# Patient Record
Sex: MALE | Race: WHITE | NOT HISPANIC OR LATINO | Employment: FULL TIME | URBAN - METROPOLITAN AREA
[De-identification: names, ages, dates, MRNs, and addresses within clinical notes are randomized per-mention and may not be internally consistent; named-entity substitution may affect disease eponyms.]

---

## 2017-02-21 ENCOUNTER — GENERIC CONVERSION - ENCOUNTER (OUTPATIENT)
Dept: OTHER | Facility: OTHER | Age: 27
End: 2017-02-21

## 2017-02-21 ENCOUNTER — ALLSCRIPTS OFFICE VISIT (OUTPATIENT)
Dept: OTHER | Facility: OTHER | Age: 27
End: 2017-02-21

## 2017-02-22 ENCOUNTER — ALLSCRIPTS OFFICE VISIT (OUTPATIENT)
Dept: OTHER | Facility: OTHER | Age: 27
End: 2017-02-22

## 2017-02-22 ENCOUNTER — GENERIC CONVERSION - ENCOUNTER (OUTPATIENT)
Dept: OTHER | Facility: OTHER | Age: 27
End: 2017-02-22

## 2017-02-23 ENCOUNTER — ALLSCRIPTS OFFICE VISIT (OUTPATIENT)
Dept: OTHER | Facility: OTHER | Age: 27
End: 2017-02-23

## 2017-02-27 ENCOUNTER — ALLSCRIPTS OFFICE VISIT (OUTPATIENT)
Dept: OTHER | Facility: OTHER | Age: 27
End: 2017-02-27

## 2017-02-27 DIAGNOSIS — Z79.899 OTHER LONG TERM (CURRENT) DRUG THERAPY: ICD-10-CM

## 2017-02-27 DIAGNOSIS — F33.2 MAJOR DEPRESSIVE DISORDER, RECURRENT SEVERE WITHOUT PSYCHOTIC FEATURES (HCC): ICD-10-CM

## 2017-02-28 ENCOUNTER — TRANSCRIBE ORDERS (OUTPATIENT)
Dept: LAB | Facility: CLINIC | Age: 27
End: 2017-02-28

## 2017-02-28 ENCOUNTER — APPOINTMENT (OUTPATIENT)
Dept: LAB | Facility: CLINIC | Age: 27
End: 2017-02-28
Payer: COMMERCIAL

## 2017-02-28 DIAGNOSIS — F33.2 MAJOR DEPRESSIVE DISORDER, RECURRENT SEVERE WITHOUT PSYCHOTIC FEATURES (HCC): ICD-10-CM

## 2017-02-28 DIAGNOSIS — Z79.899 OTHER LONG TERM (CURRENT) DRUG THERAPY: ICD-10-CM

## 2017-02-28 LAB
ALBUMIN SERPL BCP-MCNC: 4.1 G/DL (ref 3.5–5)
ALP SERPL-CCNC: 61 U/L (ref 46–116)
ALT SERPL W P-5'-P-CCNC: 17 U/L (ref 12–78)
ANION GAP SERPL CALCULATED.3IONS-SCNC: 6 MMOL/L (ref 4–13)
AST SERPL W P-5'-P-CCNC: 10 U/L (ref 5–45)
BASOPHILS # BLD AUTO: 0.03 THOUSANDS/ΜL (ref 0–0.1)
BASOPHILS NFR BLD AUTO: 1 % (ref 0–1)
BILIRUB SERPL-MCNC: 0.2 MG/DL (ref 0.2–1)
BUN SERPL-MCNC: 10 MG/DL (ref 5–25)
CALCIUM SERPL-MCNC: 9.2 MG/DL (ref 8.3–10.1)
CHLORIDE SERPL-SCNC: 104 MMOL/L (ref 100–108)
CHOLEST SERPL-MCNC: 156 MG/DL (ref 50–200)
CO2 SERPL-SCNC: 30 MMOL/L (ref 21–32)
CREAT SERPL-MCNC: 0.81 MG/DL (ref 0.6–1.3)
EOSINOPHIL # BLD AUTO: 0.28 THOUSAND/ΜL (ref 0–0.61)
EOSINOPHIL NFR BLD AUTO: 5 % (ref 0–6)
ERYTHROCYTE [DISTWIDTH] IN BLOOD BY AUTOMATED COUNT: 13.7 % (ref 11.6–15.1)
GFR SERPL CREATININE-BSD FRML MDRD: >60 ML/MIN/1.73SQ M
GLUCOSE SERPL-MCNC: 94 MG/DL (ref 65–140)
HCT VFR BLD AUTO: 44.3 % (ref 36.5–49.3)
HDLC SERPL-MCNC: 42 MG/DL (ref 40–60)
HGB BLD-MCNC: 14.5 G/DL (ref 12–17)
LDLC SERPL CALC-MCNC: 105 MG/DL (ref 0–100)
LYMPHOCYTES # BLD AUTO: 2.32 THOUSANDS/ΜL (ref 0.6–4.47)
LYMPHOCYTES NFR BLD AUTO: 41 % (ref 14–44)
MCH RBC QN AUTO: 28 PG (ref 26.8–34.3)
MCHC RBC AUTO-ENTMCNC: 32.7 G/DL (ref 31.4–37.4)
MCV RBC AUTO: 86 FL (ref 82–98)
MONOCYTES # BLD AUTO: 0.31 THOUSAND/ΜL (ref 0.17–1.22)
MONOCYTES NFR BLD AUTO: 5 % (ref 4–12)
NEUTROPHILS # BLD AUTO: 2.77 THOUSANDS/ΜL (ref 1.85–7.62)
NEUTS SEG NFR BLD AUTO: 48 % (ref 43–75)
PLATELET # BLD AUTO: 356 THOUSANDS/UL (ref 149–390)
PMV BLD AUTO: 9.9 FL (ref 8.9–12.7)
POTASSIUM SERPL-SCNC: 4.6 MMOL/L (ref 3.5–5.3)
PROT SERPL-MCNC: 7.5 G/DL (ref 6.4–8.2)
RBC # BLD AUTO: 5.17 MILLION/UL (ref 3.88–5.62)
SODIUM SERPL-SCNC: 140 MMOL/L (ref 136–145)
TRIGL SERPL-MCNC: 47 MG/DL
TSH SERPL DL<=0.05 MIU/L-ACNC: 1.05 UIU/ML (ref 0.36–3.74)
WBC # BLD AUTO: 5.71 THOUSAND/UL (ref 4.31–10.16)

## 2017-02-28 PROCEDURE — 80061 LIPID PANEL: CPT

## 2017-02-28 PROCEDURE — 80053 COMPREHEN METABOLIC PANEL: CPT

## 2017-02-28 PROCEDURE — 84443 ASSAY THYROID STIM HORMONE: CPT

## 2017-02-28 PROCEDURE — 36415 COLL VENOUS BLD VENIPUNCTURE: CPT

## 2017-02-28 PROCEDURE — 85025 COMPLETE CBC W/AUTO DIFF WBC: CPT

## 2017-03-01 ENCOUNTER — GENERIC CONVERSION - ENCOUNTER (OUTPATIENT)
Dept: OTHER | Facility: OTHER | Age: 27
End: 2017-03-01

## 2017-03-22 ENCOUNTER — ALLSCRIPTS OFFICE VISIT (OUTPATIENT)
Dept: OTHER | Facility: OTHER | Age: 27
End: 2017-03-22

## 2017-04-14 ENCOUNTER — HOSPITAL ENCOUNTER (EMERGENCY)
Facility: HOSPITAL | Age: 27
Discharge: HOME | End: 2017-04-14
Attending: EMERGENCY MEDICINE | Admitting: EMERGENCY MEDICINE
Payer: COMMERCIAL

## 2017-04-14 VITALS
TEMPERATURE: 98.2 F | RESPIRATION RATE: 20 BRPM | OXYGEN SATURATION: 98 % | DIASTOLIC BLOOD PRESSURE: 70 MMHG | SYSTOLIC BLOOD PRESSURE: 144 MMHG | HEART RATE: 100 BPM | WEIGHT: 135 LBS | BODY MASS INDEX: 19.37 KG/M2

## 2017-04-14 DIAGNOSIS — F19.10 DRUG ABUSE (HCC): Primary | ICD-10-CM

## 2017-04-14 DIAGNOSIS — F32.A DEPRESSION: ICD-10-CM

## 2017-04-14 LAB — ETHANOL EXG-MCNC: 0 MG/DL

## 2017-04-14 PROCEDURE — 99284 EMERGENCY DEPT VISIT MOD MDM: CPT

## 2017-04-14 PROCEDURE — 82075 ASSAY OF BREATH ETHANOL: CPT | Performed by: EMERGENCY MEDICINE

## 2017-07-23 ENCOUNTER — HOSPITAL ENCOUNTER (EMERGENCY)
Facility: HOSPITAL | Age: 27
Discharge: HOME | End: 2017-07-23
Admitting: EMERGENCY MEDICINE
Payer: COMMERCIAL

## 2017-07-23 VITALS
WEIGHT: 134.48 LBS | DIASTOLIC BLOOD PRESSURE: 72 MMHG | HEART RATE: 67 BPM | BODY MASS INDEX: 19.3 KG/M2 | SYSTOLIC BLOOD PRESSURE: 131 MMHG | TEMPERATURE: 97.9 F | RESPIRATION RATE: 16 BRPM | OXYGEN SATURATION: 98 %

## 2017-07-23 DIAGNOSIS — S61.412A LACERATION OF LEFT HAND WITHOUT FOREIGN BODY, INITIAL ENCOUNTER: Primary | ICD-10-CM

## 2017-07-23 PROCEDURE — 90471 IMMUNIZATION ADMIN: CPT

## 2017-07-23 PROCEDURE — 99282 EMERGENCY DEPT VISIT SF MDM: CPT

## 2017-07-23 PROCEDURE — 90715 TDAP VACCINE 7 YRS/> IM: CPT | Performed by: NURSE PRACTITIONER

## 2017-07-23 RX ADMIN — Medication 1 APPLICATION: at 11:28

## 2017-07-23 RX ADMIN — TETANUS TOXOID, REDUCED DIPHTHERIA TOXOID AND ACELLULAR PERTUSSIS VACCINE, ADSORBED 0.5 ML: 5; 2.5; 8; 8; 2.5 SUSPENSION INTRAMUSCULAR at 12:10

## 2017-10-26 ENCOUNTER — GENERIC CONVERSION - ENCOUNTER (OUTPATIENT)
Dept: OTHER | Facility: OTHER | Age: 27
End: 2017-10-26

## 2017-10-26 DIAGNOSIS — M79.671 PAIN OF RIGHT FOOT: ICD-10-CM

## 2017-10-26 DIAGNOSIS — F31.9 BIPOLAR DISORDER (HCC): ICD-10-CM

## 2017-10-26 DIAGNOSIS — E16.2 HYPOGLYCEMIA: ICD-10-CM

## 2017-10-27 ENCOUNTER — APPOINTMENT (OUTPATIENT)
Dept: LAB | Facility: CLINIC | Age: 27
End: 2017-10-27
Payer: COMMERCIAL

## 2017-10-27 DIAGNOSIS — E16.2 HYPOGLYCEMIA: ICD-10-CM

## 2017-10-27 DIAGNOSIS — F31.9 BIPOLAR DISORDER (HCC): ICD-10-CM

## 2017-10-27 LAB
ALBUMIN SERPL BCP-MCNC: 4.2 G/DL (ref 3.5–5)
ALP SERPL-CCNC: 64 U/L (ref 46–116)
ALT SERPL W P-5'-P-CCNC: 17 U/L (ref 12–78)
ANION GAP SERPL CALCULATED.3IONS-SCNC: 8 MMOL/L (ref 4–13)
AST SERPL W P-5'-P-CCNC: 11 U/L (ref 5–45)
BASOPHILS # BLD AUTO: 0.03 THOUSANDS/ΜL (ref 0–0.1)
BASOPHILS NFR BLD AUTO: 0 % (ref 0–1)
BILIRUB SERPL-MCNC: 0.51 MG/DL (ref 0.2–1)
BUN SERPL-MCNC: 11 MG/DL (ref 5–25)
CALCIUM SERPL-MCNC: 9.4 MG/DL (ref 8.3–10.1)
CHLORIDE SERPL-SCNC: 102 MMOL/L (ref 100–108)
CHOLEST SERPL-MCNC: 158 MG/DL (ref 50–200)
CO2 SERPL-SCNC: 27 MMOL/L (ref 21–32)
CREAT SERPL-MCNC: 0.97 MG/DL (ref 0.6–1.3)
EOSINOPHIL # BLD AUTO: 0.32 THOUSAND/ΜL (ref 0–0.61)
EOSINOPHIL NFR BLD AUTO: 4 % (ref 0–6)
ERYTHROCYTE [DISTWIDTH] IN BLOOD BY AUTOMATED COUNT: 13.2 % (ref 11.6–15.1)
EST. AVERAGE GLUCOSE BLD GHB EST-MCNC: 108 MG/DL
GFR SERPL CREATININE-BSD FRML MDRD: 108 ML/MIN/1.73SQ M
GLUCOSE P FAST SERPL-MCNC: 79 MG/DL (ref 65–99)
HBA1C MFR BLD: 5.4 % (ref 4.2–6.3)
HCT VFR BLD AUTO: 42.2 % (ref 36.5–49.3)
HDLC SERPL-MCNC: 41 MG/DL (ref 40–60)
HGB BLD-MCNC: 14.4 G/DL (ref 12–17)
LDLC SERPL CALC-MCNC: 102 MG/DL (ref 0–100)
LYMPHOCYTES # BLD AUTO: 2.43 THOUSANDS/ΜL (ref 0.6–4.47)
LYMPHOCYTES NFR BLD AUTO: 31 % (ref 14–44)
MCH RBC QN AUTO: 30.3 PG (ref 26.8–34.3)
MCHC RBC AUTO-ENTMCNC: 34.1 G/DL (ref 31.4–37.4)
MCV RBC AUTO: 89 FL (ref 82–98)
MONOCYTES # BLD AUTO: 0.68 THOUSAND/ΜL (ref 0.17–1.22)
MONOCYTES NFR BLD AUTO: 9 % (ref 4–12)
NEUTROPHILS # BLD AUTO: 4.28 THOUSANDS/ΜL (ref 1.85–7.62)
NEUTS SEG NFR BLD AUTO: 56 % (ref 43–75)
NRBC BLD AUTO-RTO: 0 /100 WBCS
PLATELET # BLD AUTO: 408 THOUSANDS/UL (ref 149–390)
PMV BLD AUTO: 10.5 FL (ref 8.9–12.7)
POTASSIUM SERPL-SCNC: 4.1 MMOL/L (ref 3.5–5.3)
PROT SERPL-MCNC: 7.8 G/DL (ref 6.4–8.2)
RBC # BLD AUTO: 4.76 MILLION/UL (ref 3.88–5.62)
SODIUM SERPL-SCNC: 137 MMOL/L (ref 136–145)
TRIGL SERPL-MCNC: 74 MG/DL
TSH SERPL DL<=0.05 MIU/L-ACNC: 1.23 UIU/ML (ref 0.36–3.74)
WBC # BLD AUTO: 7.75 THOUSAND/UL (ref 4.31–10.16)

## 2017-10-27 PROCEDURE — 84443 ASSAY THYROID STIM HORMONE: CPT

## 2017-10-27 PROCEDURE — 80053 COMPREHEN METABOLIC PANEL: CPT

## 2017-10-27 PROCEDURE — 36415 COLL VENOUS BLD VENIPUNCTURE: CPT

## 2017-10-27 PROCEDURE — 85025 COMPLETE CBC W/AUTO DIFF WBC: CPT

## 2017-10-27 PROCEDURE — 80061 LIPID PANEL: CPT

## 2017-10-27 PROCEDURE — 84681 ASSAY OF C-PEPTIDE: CPT

## 2017-10-27 PROCEDURE — 83036 HEMOGLOBIN GLYCOSYLATED A1C: CPT

## 2017-10-28 LAB — C PEPTIDE SERPL-MCNC: 1.4 NG/ML (ref 1.1–4.4)

## 2017-10-29 ENCOUNTER — OFFICE VISIT (OUTPATIENT)
Dept: URGENT CARE | Age: 27
End: 2017-10-29
Payer: COMMERCIAL

## 2017-10-29 ENCOUNTER — APPOINTMENT (OUTPATIENT)
Dept: RADIOLOGY | Age: 27
End: 2017-10-29
Attending: PHYSICIAN ASSISTANT
Payer: COMMERCIAL

## 2017-10-29 ENCOUNTER — TRANSCRIBE ORDERS (OUTPATIENT)
Dept: URGENT CARE | Age: 27
End: 2017-10-29

## 2017-10-29 DIAGNOSIS — M79.671 PAIN OF RIGHT FOOT: ICD-10-CM

## 2017-10-29 PROCEDURE — 99203 OFFICE O/P NEW LOW 30 MIN: CPT | Performed by: FAMILY MEDICINE

## 2017-10-29 PROCEDURE — 73630 X-RAY EXAM OF FOOT: CPT

## 2017-10-29 PROCEDURE — S9088 SERVICES PROVIDED IN URGENT: HCPCS | Performed by: FAMILY MEDICINE

## 2018-01-09 NOTE — PSYCH
Provider Comments  Provider Comments:   Gris Loomis no showed for our scheduled appointment today  No call  I did call but got no answer at listed number        Signatures   Electronically signed by : Naa Morris, ; Mar 22 2017 10:16AM EST                       (Author)

## 2018-01-10 NOTE — PSYCH
History of Present Illness    Pre-morbid level of function and History of Present Illness:  Went to see PCP 3 days ago after an incident a week ago in which he attempted to commit suicide by taking Trazedone-- after telling his cousin, his parents talked him out of it--he went upstairs and threw it all up--  Dr Ange Coburn took him off all his meds and he can only take Benadryl to sleep  Reason for evaluation and partial hospitalization as an alternative to inpatient hospitalization:   2 weeks ago, started hanging around wrong pp again- I don't have a lot of friends--grew up in [place], [place] to [facility], gets lonely, parents found out-- took car and phone away  They thought he was using again--he wasn't--lost job--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 Has never slept through the night  Outpatient and/or Partial and Other [employer] Used (CTT, ICM, VNA): Inpatient: Rehab  Family Constellation (include parents, relationship with each and pertinent Psych/Medical History): Mother: left my life at age 1 mos, tried to come back into my life at age 14  Father: has been in my life all time   Children: brother 15   Sibling: sister-10   Sibling: sister-5   Other: step mom- is one I call mom--has been there since I was 5  The patient relates best to dad  He lives with fatherterrie  He does not live alone  Domestic Violence: There is no history of child abuse  There is no history of sexual abuse     Additional Comments related to family/relationships/peer support: I have lied in the past- I am telling the truth now--I have been shunned for it  It is the same outcome regardless  School or Work History (strengths/limitations/needs: did not talk for 33 days while in rehab during group! is good one on one  Has been judged his whole life-- does not want to be judged  Always has been the outcast  Has always had to do stuff to fit in--  LEISURE ASSESSMENT (Include past and present hobbies/interests and level of involvement (Ex: Group/Club Affiliations): nothing--eat, sleep  I worked a lot  I blew all the money on cars, drugs, going out, out to eat, cigarettes, weed  His primary language is  [place]  Preferred language is   Religions affiliations and level of involvement -  Spirituality and emily have not helped him cope with difficult situations in his life  SUBSTANCE ABUSE ASSESSMENT: current substance abuse and past substance abuse  Substance/Route/Age/Amount/Frequency/Last Use: Past opiate addiction- red flagged from meds  lived by self in hotel at job site, tried heroin, got addicted for 6 mos  [facility]  Has been clean since discharge--1 year  Uses marijuana for stomach- duodenitis  (acid, soda upsets stomach)     Pot is the only thing that has helped me be a normal person in society-- I don't have depression when I smoke--it gives me energy and allows me to go about the day-I wake up every morning and don't have the energy to get up  I need something to give me a kick in the ass  the only time he has ever been happy is when he has been high  no medication has ever helped  See above  HEALTH ASSESSMENT: no referral to PCP needed  no pregnancy  not referred to PCP  Current PCP: Dr He Balderas: No [address] Directive or Power of  on file  He does not want an information packet about Mental Health Advance Directives  The following ratings are based on my observation of this patient over the last Intake     Risk of Harm to Self:   Demographic risk factors include , alaskan, or native [place] and male  Recent Specific Risk Factors include: made an attempt of med lethality, sense of hopelessness/helplessness and diagnosis of depression  These risk factors presented within the last week  Risk of Harm to Others:   Recent Specific Risk Factors include: abusing substances and multiple stressors  Based on the above information, the client presents the following risk of harm to self or others: low  The following interventions are recommended: consultation with Dr Quang Nieto and referral to [facility]  Notes regarding this Risk Assessment: all meds are locked up  Pt is under direct supervision at all times by family  Pt is making family aware of his emotional status daily  Review of Systems  anxiety, depression, euphoria, emotional lability, impulsive behavior, disturbing or unusual thoughts, feelings, or sensations and sleep disturbances, but no hostility, not suidical, no compulsive behavior, no unusual behavior, no violent behavior, no unreasonable or irrational fears, no magical thinking, not having fantasies, no personality change and no character deficiency  Neurological: headache  Active Problems    1  Abdominal pain (789 00) (R10 9)   2  Adjustment disorder with depressed mood (309 0) (F43 21)   3  Allergic rhinitis (477 9) (J30 9)   4  Anxiety (300 00) (F41 9)   5  Axillary lymphadenopathy (785 6) (R59 0)   6  Bipolar disorder (296 80) (F31 9)   7  Chills (780 64) (R68 83)   8  Constipation (564 00) (K59 00)   9  Decreased appetite (783 0) (R63 0)   10  Disc degeneration, lumbar (722 52) (M51 36)   11  Enteritis, infectious (009 0) (A09)   12  Fatigue (780 79) (R53 83)   13  Fibromyalgia (729 1) (M79 7)   14  Insomnia (780 52) (G47 00)   15  Low back pain (724 2) (M54 5)   16  Sore throat (462) (J02 9)   17  Suicidal deliberate poisoning (989 9,E950 9) (T65 92XA)   18  Vitamin D deficiency (268 9) (E55 9)    Past Medical History    1   No pertinent past medical history   2  History of Opioid dependence (304 00) (F11 20)    Past Psychiatric History    Past Psychiatric History: Jayson Schreiber  Surgical History    The surgical history was reviewed and updated today  Family Psych History  Mother    1  No pertinent family history  Father    2  Family history of Depression with anxiety    The family history was reviewed and updated today  Substance Abuse Hx    Substance Abuse History: see above  Social History    · Current every day smoker (305 1) (F17 200)  The social history was reviewed and updated today  The social history was reviewed and is unchanged  Current Meds   1  EpiPen 2-Eleuterio 0 3 MG/0 3ML Injection Solution Auto-injector; Therapy: 79HDP3541 to (Evaluate:17Nov2014) Recorded   2  TraZODone HCl - 100 MG Oral Tablet; TAKE ONE (1) TABLET(S) DAILY AT BEDTIMEAS   NEEDED; Therapy: 11Apr2016 to (Evaluate:29Jul2016)  Requested for: 30Apr2016; Last   Rx:30Apr2016 Ordered    The medication list was reviewed and updated today  Allergies    1  No Known Drug Allergies    Physical Exam    Appearance: restless and fidgety and poor eye contact  Observed mood: anxious and ashamed  Observed mood: affect was tearful  Speech: a normal rate  Thought processes: coherent/organized  Hallucinations: visual hallucinations   white lights  Thought Content: no delusions  Abnormal Thoughts: The patient has no suicidal thoughts  Orientation: The patient is oriented to person, place and time  Recent and Remote Memory: short term memory intact and long term memory intact  Insight: Poor insight  DSM    Provisional Diagnosis: Bipolar, ESEQUIEL  Assessment    1  Bipolar disorder (296 80) (F31 9)   2   Anxiety (300 00) (F41 9)    Future Appointments    Date/Time Provider Specialty Site   03/01/2017 01:00 PM Dario Roa [facility] [address]   02/27/2017 03:00 PM Lonnie Ruiz[facility] [place] [facility]     Signatures   Electronically signed by : Elvin Mendoza; Feb 23 2017  3:00PM EST                       (Author)    Electronically signed by : Nilda Phillips DO; Feb 23 2017  3:26PM EST                       (Author)

## 2018-01-10 NOTE — PSYCH
Message  Message Free Text Note Form: spoke to [facility]- pt states "every day is different --" his emotions  He did take "all of his Trazedone but 2" after argument with his family for hanging around with negative peer group and losing car priveleges  Then, was communicating with his cousin, told him what he did, his cousin came over, [facility] [address] [facility], told dad, told him they love him, felt guilty and went to bathroom and vomited up pills   (family was unaware that he had already taken them)   He said he felt more supported for few days, felt tearful though again today-- VERY interested in [facility]  [facility] will do PHP referral today  Excessive worrying-- Mike Mendezs might be able to get him in at some point next week  [facility] is out the rest of the week  No SI at present-- will go to father if suicidal  FAther will take to ER       Active Problems    1  Abdominal pain (789 00) (R10 9)   2  Adjustment disorder with depressed mood (309 0) (F43 21)   3  Allergic rhinitis (477 9) (J30 9)   4  Anxiety (300 00) (F41 9)   5  Axillary lymphadenopathy (785 6) (R59 0)   6  Bipolar disorder (296 80) (F31 9)   7  Chills (780 64) (R68 83)   8  Constipation (564 00) (K59 00)   9  Decreased appetite (783 0) (R63 0)   10  Disc degeneration, lumbar (722 52) (M51 36)   11  Enteritis, infectious (009 0) (A09)   12  Fatigue (780 79) (R53 83)   13  Fibromyalgia (729 1) (M79 7)   14  Insomnia (780 52) (G47 00)   15  Low back pain (724 2) (M54 5)   16  Sore throat (462) (J02 9)   17  Suicidal deliberate poisoning (989 9,E950 9) (T65 92XA)   18  Vitamin D deficiency (268 9) (E55 9)    Current Meds   1  EpiPen 2-Eleuterio 0 3 MG/0 3ML Injection Solution Auto-injector; Therapy: 17HGT5510 to (Evaluate:17Nov2014) Recorded   2  TraZODone HCl - 100 MG Oral Tablet; TAKE ONE (1) TABLET(S) DAILY AT BEDTIMEAS   NEEDED; Therapy: 51Yyl8195 to (Evaluate:18Tsd6802)  Requested for: 30Apr2016; Last   Rx:30Apr2016 Ordered    Allergies    1   No Known Drug Allergies    Signatures   Electronically signed by : Rashawn Hu LCSW; Feb 22 2017 12:15PM EST                       (Author)

## 2018-01-13 NOTE — PSYCH
Behavioral Health Outpatient Intake    Referred By: Jian Ulrich  Intake Questions: there are no developmental disabilities  the patient does not have a hearing impairment  the patient does not have an ICM or CTT  patient is not taking injectable psychiatric medications  Employment: The patient is not employed  at unemployed  Emergency Contact Information:   Emergency Contact: BECKY MAC   Relationship to Patient: FATHER   Phone Number: [phone]   Previous Psychiatric Treatment: He has previously been seen by a psychiatrist  Diya Menjivar  He has not been previously seen by a therapist     History: no  service  He has not had combat service  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 as a member of the [employer] or [employer]  Insurance Subscriber: ERYN WATTS   : 1--73   Employer: [employer]   Primary Insurance: [employer]   ID number: [id number]   Other [facility] Information[facility] Payal Ilya [id number]     Presenting Problem (in patient's words): SUICIDE ATTEMPT, DEPRESSION, 5 DAYS AGO  Substance Abuse: HX OF HEROIN ABUSE, CLEAN 6 MONTHS  Previous Treatment: The patient has not been seen here in the past      Accepted as Patient   [facility] 17 @ 2pm, Dr Igancia Del Valle 17 @ 3:00     Primary Care Physician: DR Linda Rivera   Electronically signed by : Chao Perez, ; 2017  3:35PM EST                       (Author)    Electronically signed by : Chao Perez, ; 2017  3:58PM EST                       (Author)

## 2018-01-14 VITALS
BODY MASS INDEX: 21.62 KG/M2 | DIASTOLIC BLOOD PRESSURE: 60 MMHG | SYSTOLIC BLOOD PRESSURE: 108 MMHG | HEART RATE: 76 BPM | WEIGHT: 146 LBS | TEMPERATURE: 97.2 F | HEIGHT: 69 IN | RESPIRATION RATE: 16 BRPM

## 2018-01-15 NOTE — PSYCH
History of Present Illness  Psychotherapy Provided [facility]: Individual Psychotherapy 50 minutes provided today  Goals addressed in session:   Met with pt for the initial session due to a crisis referral fro Dr Janita Oppenheim  Pt attempted to OD on Trazodone 6 days ago due to feeling that it would be easier on those around him if he was no longer around  Pt reports that he told his cousin that he was considering suicide and that his cousin came to him showing that people do care about him  Pt had a full discussion then with his family regarding his feelings and his attempt  Pt was able to throw up the medication that he had taken  Pt reports that he does not know what his emotions will be like each day when he wakes up  Pt reports sometimes being on a complete high and sometimes being very low  Pt reports that he worries excessively about things and struggles when things are out of his control  Pt does have a history of using marijuana and heroin for self medication of his mental health  Pt went to rehab for 33 days and has been sober for the past 6 months  Pt denies that he has SI at this time  Discussed the plan if the pt's suicidal ideation reappear  Pt will tell his father and will go willing to the ER for a crisis evaluation  Pt feels that he will be able to manage knowing that he has supports in place now  Pt's father is home with him at all times  HPI - Psych: Pt is not taking any medication at this time  Pt has been told that he can take a small amount of Benadryl for sleep at night and pt's father is in charge of monitoring any medication  Pt reports that she spends time with her father, mother, and other family members throughout the day for support  Pt is not working at this time and feels that it is more difficult for him through the day to fill his time with things   Pt is consistent in stating that he is not currently experiencing any SI and is in complete agreement with the safety plan of telling his father and going to the ER if his SI develops again   Note   Note:   Discussed with pt the inconsistencies of his emotions and the frustration of feeling out of control not knowing what he will feel like each day  Pt will be following up with Cherelle jimenez on 2/23/17 and with Dr Leitha Heimlich on 2/27/17  Pt is in agreement with the [facility] referral  Pt is encouraged to follow up with this worker as needed in the future  Assessment    1   Anxiety (300 00) (F41 9)    Signatures   Electronically signed by : Carleen Thomas LCSW; Feb 22 2017 11:33AM EST                       (Author)

## 2018-01-15 NOTE — PSYCH
Message  Message Free Text Note Form: returned call from Miguel Angel Mohan  Informed her that pt did attend session and that he has a safety plan  Will add 3 additional to his auth- 8 total  Ok to see Chaparro Strong and Carleen  Active Problems    1  Abdominal pain (789 00) (R10 9)   2  Adjustment disorder with depressed mood (309 0) (F43 21)   3  Allergic rhinitis (477 9) (J30 9)   4  Anxiety (300 00) (F41 9)   5  Axillary lymphadenopathy (785 6) (R59 0)   6  Bipolar disorder (296 80) (F31 9)   7  Chills (780 64) (R68 83)   8  Constipation (564 00) (K59 00)   9  Decreased appetite (783 0) (R63 0)   10  Disc degeneration, lumbar (722 52) (M51 36)   11  Enteritis, infectious (009 0) (A09)   12  Fatigue (780 79) (R53 83)   13  Fibromyalgia (729 1) (M79 7)   14  Insomnia (780 52) (G47 00)   15  Low back pain (724 2) (M54 5)   16  Sore throat (462) (J02 9)   17  Suicidal deliberate poisoning (989 9,E950 9) (T65 92XA)   18  Vitamin D deficiency (268 9) (E55 9)    Current Meds   1  EpiPen 2-Eleuterio 0 3 MG/0 3ML Injection Solution Auto-injector; Therapy: 58JWY2717 to (Evaluate:67Htm7057) Recorded   2  TraZODone HCl - 100 MG Oral Tablet; TAKE ONE (1) TABLET(S) DAILY AT BEDTIMEAS   NEEDED; Therapy: 82Qgl0229 to (Evaluate:59Vca7183)  Requested for: 30Apr2016; Last   Rx:09Lnn0681 Ordered    Allergies    1  No Known Drug Allergies    Plan    1   Behavioral Health Service Flowsheet; Status:Complete - Retrospective Authorization;     Done: 46GZU9443 08:26AM    Signatures   Electronically signed by : Charley Silva LCSW; Feb 24 2017 12:50PM EST                       (Author)

## 2018-01-16 NOTE — PSYCH
Assessment    1  Bipolar disorder (296 80) (F31 9)   2  ESEQUIEL (generalized anxiety disorder) (300 02) (F41 1)   3  Social phobia (300 23) (F40 10)      Inga Boyer presents with a symptom set that is difficult to tease out  I believe that bipolar disorder unspecified is the most appropriate direction to go right now  Also generalized anxiety disorder and social phobia  He does have some auditory hallucinations that are questionable, however I do not know that this is actually psychotic, perhaps it is illusions of hearing a car outside  I suspect the possibility of personality disorder, although this may simply just be severe depression/bipolar/anxiety  Patient does not have suicidal ideation and does have good protective plan in place  I think other than marijuana drugs are not presently at play, but I will keep an eye on this as he has reported erratic behavior from father  Multiple medications were considered today including mirtazapine, however once I found out from conversation with his father that bipolar disorder seems more likely we decided to go in a different direction  Lamictal, lithium, Abilify, Seroquel were all considerations  We decided on Seroquel  PARQ was thoroughly explored including EPS, tardive dyskinesia, metabolic syndrome, rare seizure, rare cardiovascular events, orthostatic hypotension, sedation, confusion  It appears he does have a family history possibly of bipolar disorder on his maternal side although they do not know much about his mother's side  He has a history of opiate use including heroin and currently only uses marijuana  He has a fairly good support system although it sounds like there is a lot of pressure from his father due to his frustration of him not doing much with his life presently  Generally the patient is healthy  He does not meet criteria for PTSD today, however his PTSD screening tool was not completely filled out but is suggestive    MDQ was positive with 10 in #1, yes #2, moderate problem #3, yes possibly #4 (maternal aunt may have bipolar disorder), and yes to #5  ESEQUIEL 7 was 9, very difficult    Some noteworthy things that he mentioned today that I'll consider include AH (car engine x4 days) , periods of 'adrenaline' when excited  causing tremor, racing heart a few seconds, but not associated with panic attacks, history of addiction and current use of marijuana regularly  He is very fixated on energy and I think he wants a pill for that but appreciated the association of energy issues with other symptoms and diagnoses  Plan    1  QUEtiapine Fumarate 50 MG Oral Tablet (SEROquel); Take 1 tab nightly  Increase   by 1 tab every 2-3 nights until you reach 200mg nightly    2  (1) CBC/PLT/DIFF; Status:Active; Requested for:28Gpb8931;    3  (1) COMPREHENSIVE METABOLIC PANEL; Status:Active; Requested for:19Iwg5416;    4  (1) TSH WITH FT4 REFLEX; Status:Active; Requested for:74Inr5060;     5  (1) LIPID PANEL, FASTING; Status:Active; Requested for:94Fpr2528;     6  TraZODone HCl - 100 MG Oral Tablet      1) Meds:   - Start seroquel 50mg HS, increase q 2-3 days by 50mg HS, NTE 200mg HS  PARQ including TD, EPS, Metabolic syndrome, ortho hypo, rare cardiac and seizure events, sedation, confusion, not to drive on this, NMS  2) Labs/tests:    - ordered Lipid, TSH, CMP, CBC   - PTSD Checklist (partially completed)   - MOOD CHART given   - consider UDS? Pt uses marijuana,denies other but strong h/o heroin  Reasonable to take at his word today      3) Therapy:   - Sees Angeles Baeza   - May be starting [facility] (he reports insurance issues)    4) Medical: fibromyalgia, 'coma' age ~3-9mo (due to pneumonia) and fractured clavicle on birth  - pt to f/u with other providers    5) Other:   - pt lives with father, step mother ('mom' since 5)   - Unemployed, has GF, [place]   - some good support, but family frustration present    6) Follow up:    - 4wk, but pt to call if issues or concerns      Chief Complaint  "I never know what day to day will bring"      History of Present Illness  Belinda Brittle presents today discussing his concerns with energy, depression, and anxiety  He says that he did it impulsively take some pills about 9 days ago but told somebody and actually cause himself to throw up  He said he's had no suicidal thoughts since then  I talked to he and his father, Jero Benton, about risks and options for support in the area as well as in the need for communication with support services such as 911, emergency department, family and friends  The patient said that he would only reach out to people if he was feeling that way again but does not have any concerns at this time  Depressions 8-9/10, anxiety as a 7/10, sleep has been about 4 hours a night on average but its disrupted, energy is low, appetite comes and goes, he denies any homicidal ideation  He's not taking any medications presently  He has a he's been feeling this depressed for a long time perhaps a year or so  He's had periods of depression in the past as well  He'll have very intermittently periods were have some burst of energy but it is very short-lived  Overall he said that life has been very hard and it's hard for him to get motivated and out the door  He has used harder substances in the past, but presently just uses marijuana on a daily basis to help with his anxiety  Stressors include being at home, not having a job, having relationship challenges, financial issues, life direction issues  Review of Systems  Psychiatric review of systems:    Regarding major depression he meets all criteria for major depressive episode aside from having suicidal ideation which he says he has not had since the overdose approximately 9 days ago  Regarding bipolar disorder he does admit to having periods of symptoms that would substantiate a unspecified diagnosis  His father substantiated this   It appears that he'll have one day perhaps 2 days at most will have higher energy  This is when he'll be more talkative be a little more irritable and have more racing thoughts and goal-directed behaviors as well as distractibility  Patient admits that he also feels good on those days  They never last more than 2 days at a time  Regarding generalized anxiety disorder he admits to symptoms that would support generalized anxiety disorder including significant worry, restlessness, irritability, concentration issues, tense muscles, difficulty with sleep and fatigue  Regarding social phobia he says that he doesn't criteria for this symptom set as well  Regarding PTSD he was written by a dog which tore his lip off in the sixth grade  He said since then he will have flashbacks to feeling like he is reliving that moment whenever he hears dogs barking and other scenarios  He avoids dogs  He feels that he is hypervigilant around external surroundings  He denies start low reflex increase, negative emotions, difficulty with recall, difficulty finding positive emotions, difficulty making connections with other people  Regarding substances please see social history  Briefly he did use heroin for approximate 6 months but last use was one year ago  He also uses marijuana on a daily basis  Regarding psychosis, he denies ever having paranoia, auditory or visual hallucinations outside of using substances aside from having some auditory hallucinations over last 4 days where he feels like he is hearing the car running outside  Nothing beyond this  anxiety, depression, emotional lability and sleep disturbances  Constitutional: feeling tired  ENT: no complaints of earache, no loss of hearing, no nosebleeds or nasal discharge, no sore throat or hoarseness  Cardiovascular: no complaints of slow or fast heart rate, no chest pain, no palpitations, no leg claudication or lower extremity edema     Respiratory: no complaints of shortness of breath, no wheezing or cough, no dyspnea on exertion, no orthopnea or PND  Gastrointestinal: no complaints of abdominal pain, no constipation, no nausea or vomiting, no diarrhea or bloody stools  Genitourinary: no complaints of dysuria or incontinence, no hesitancy, no nocturia, no genital lesion, no inadequacy of penile erection  Musculoskeletal: no complaints of arthralgia, no myalgia, no joint swelling or stiffness, no limb pain or swelling  Integumentary: no complaints of skin rash or lesion, no itching or dry skin, no skin wounds  Neurological: no complaints of headache, no confusion, no numbness or tingling, no dizziness or fainting  Other Symptoms: No endocrine issues or history reported other than fatigue  Past Psychiatric History    Past Psychiatric History: Patient has a suicide attempt partially 9 days ago where he took 600 mg of trazodone but restart the family members and caused himself with her  He's had no other suicide attempts and denies suicidal ideation now  He says that he is currently in therapy with Jim Fragoso and just started, and back in high school he also had a therapist     Patient has never been hospitalized, seen a psychiatrist, had self-harm or homicidal ideation or violence towards others  Past and current medications include Wellbutrin  mg which he does not feel helped and took for 9 months  Lexapro 10 mg did not seem to help and he only took it for 2 weeks, Zoloft 50 mg did not help and he didn't take it long enough either and only for 2 weeks  He says he had no side effects or issues with his medications  Trazodone seem to help with his sleep and he is taking 100 mg nightly  Both Xanax and lorazepam causes him to feel weird and he did not want to take those medications  He has also been on Suboxone for heroin addiction but this was approximately one year          Substance Abuse Hx    Substance Abuse History: History of heroin dependence with Suboxone treatment in 2016  Uses marijuana daily  Active Problems    1  Abdominal pain (789 00) (R10 9)   2  Allergic rhinitis (477 9) (J30 9)   3  Anxiety (300 00) (F41 9)   4  Axillary lymphadenopathy (785 6) (R59 0)   5  Bipolar disorder (296 80) (F31 9)   6  Chills (780 64) (R68 83)   7  Constipation (564 00) (K59 00)   8  Decreased appetite (783 0) (R63 0)   9  Disc degeneration, lumbar (722 52) (M51 36)   10  Enteritis, infectious (009 0) (A09)   11  Fatigue (780 79) (R53 83)   12  Fibromyalgia (729 1) (M79 7)   13  Insomnia (780 52) (G47 00)   14  Low back pain (724 2) (M54 5)   15  Sore throat (462) (J02 9)   16  Suicidal deliberate poisoning (989 9,E950 9) (T65 92XA)   17  Vitamin D deficiency (268 9) (E55 9)    Past Medical History    1  Denied: History of concussion   2  No pertinent past medical history   3  History of Opioid dependence (304 00) (F11 20)   4  Denied: History of Seizures    The active problems and past medical history were reviewed and updated today  Surgical History    The surgical history was reviewed and updated today  Allergies    1  No Known Drug Allergies    Current Meds   1  EpiPen 2-Eleuterio 0 3 MG/0 3ML Injection Solution Auto-injector; Therapy: 13XRF4048 to (Evaluate:17Nov2014) Recorded   2  TraZODone HCl - 100 MG Oral Tablet; TAKE ONE (1) TABLET(S) DAILY AT BEDTIMEAS   NEEDED; Therapy: 46Qpc5933 to (Evaluate:82Rkx9361)  Requested for: 30Apr2016; Last   Rx:97Gyp2792 Ordered    The medication list was reviewed and updated today  Family Psych History  Father    1  Family history of Depression with anxiety  Maternal Aunt    2  Family history of bipolar disorder (V17 0) (Z81 8)    The family history was reviewed and updated today  Social History    · Current every day smoker (305 1) (F17 200)   · No alcohol use   · No caffeine use   · Uses marijuana (305 20) (F12 90)  The social history was reviewed and updated today     Jose Butts was raised locally in that his childhood was "hectic"  He denies any physical or sexual abuse or neglect issues but notes that his mom left when he was 1 months old and tried come back at age of 14 that was not allowed back to the family  He has no contact with her  He was raised by his stepmother and his father and they have a good relationship  His 3 younger siblings one brother and 2 sisters  He did have a traumatic birth which caused clavicular fracture  Then around the age of 3-9 months he developed pneumonia and says that he was in a coma for perhaps 2-3 months  Otherwise developed normally  He is a high school diploma but no job presently  He has a girlfriend [place] for the last 9 months  He has no children  He lives with his father and stepmother  His support system includes [facility] and his father  He has no Caodaism preference, no  history, no legal issues or history, and no weapons at home  He smokes about half a pack of tobacco a day, does not use caffeine, does not drink alcohol but very occasionally as an once every several months  He does smoke marijuana daily, and has a history of doing heroin  Last use was one year ago but he did it for approximate 6 months at one time  He also has history of using opiate pills  He went to [facility] in 2016 and was on Suboxone  History Of Phys/Sex Abuse Or Perpetration    History Of Phys/Sex Abuse or Perpetration: Denies  Vitals  Signs   Recorded: 95Whn5208 05:09PM   Heart Rate: 914  Systolic: 783  Diastolic: 90  Weight: 477 lb   BMI Calculated: 21 71  BSA Calculated: 1 81    Physical Exam    Appearance: was calm and cooperative and good eye contact  Observed mood: depressed and anxious  Observed mood: affect was constricted  Speech: a normal rate and fluent  Thought processes: coherent/organized  Hallucinations: no hallucinations present  Thought Content: no delusions  Abnormal Thoughts:  The patient has no suicidal thoughts and no homicidal thoughts  Orientation: The patient is oriented to person, place and time  Recent and Remote Memory: short term memory intact and long term memory intact  Attention Span And Concentration: concentration intact  Insight: Insight intact  Judgment: His judgment was intact  Muscle Strength And Tone  Muscle strength and tone were normal  Normal gait and station  Language: no difficulty naming common objects and no difficulty repeating a phrase  Fund of knowledge: Patient displays adequate knowledge of current events and adequate fund of knowledge regarding past history  Risks, Benefits And Possible Side Effects Of Medications: Risks, benefits, and possible side effects of medications explained to patient and patient verbalizes understanding  not prescribing controlled substances      DSM    Provisional Diagnosis: r/o PTSD  Consider Unipolar disorder? Consider personality disorder (lability, impulsivity)  Rule out psychosis (unlikely)  End of Encounter Meds    1  QUEtiapine Fumarate 50 MG Oral Tablet (SEROquel); Take 1 tab nightly  Increase by 1   tab every 2-3 nights until you reach 200mg nightly; Therapy: 53ANM1856 to (Last Rx:33Rnu8776)  Requested for: 04Zfr9260 Ordered    2  EpiPen 2-Eleuterio 0 3 MG/0 3ML Injection Solution Auto-injector;    Therapy: 99CHP3164 to (Evaluate:05Mkx1273) Recorded    Future Appointments    Date/Time Provider Specialty Site   03/01/2017 01:00 PM Jr Santillan LCSW  [facility] PSYCH [facility]   04/04/2017 09:30 AM Skip Platt DO Psychiatry [facility] 81     Signatures   Electronically signed by : Skip Platt DO; Feb 27 2017  5:37PM EST                       (Author)    Electronically signed by : Skip Platt DO; Feb 28 2017  1:43PM EST                       (Author)

## 2018-01-16 NOTE — MISCELLANEOUS
Provider Comments  Provider Comments:   PT NO SHOWED TODAYS APPOINTMENT        Signatures   Electronically signed by : HERMES Copeland ; Aug  9 6716  8:21PM EST                       (Author)

## 2018-01-16 NOTE — MISCELLANEOUS
Provider Comments  Provider Comments:   PT NO SHOW FOR TODAYS APPT FOR REHAB F/U      Signatures   Electronically signed by :  HERMES Araujo ; Mar 29 2016  4:58PM EST                       (Author)

## 2018-01-18 NOTE — PROGRESS NOTES
Assessment    1  Right foot pain (729 5) (M89 371)   2  Closed displaced fracture of proximal phalanx of right great toe, initial encounter (826 0)   (F70 121A)    Plan  Closed displaced fracture of proximal phalanx of right great toe, initial encounter    · Ace Bandage; Status:Complete;   Done: 68BOL1099   · Crutches; Status:Active; Requested VY72JQL9980;    · Orthopedic Footwear Men's Shoes; Status:Complete;   Done: 69HVW2815   · *1 -  [facility] (ORTHOPEDIC SURGERY )  Co-Management  *  Status: Active  Requested for: 11RNA1973  Care Summary provided  : Yes   · Avoid bearing weight on your leg by using crutches ; Status:Complete;   Done:  80IRR1773 11:53AM  Right foot pain    · * XR FOOT 3+ VIEW RIGHT; Status:Resulted - Requires Verification,Retrospective By  Protocol Authorization;   Done: 13BVW6070 11:34AM    Discussion/Summary  Discussion Summary:   Rest, ice, elevate foot  Ibuprofen as needed for pain  Crutches for partial weight-bearing as tolerated  Avoid steps and ladders  Follow with orthopedist this next week for further evaluation  Note given for work  surgical shoe an Ace wrap applied by staff in office  Crutches provided and instruction given  Understands and agrees with treatment plan: The treatment plan was reviewed with the patient/guardian  The patient/guardian understands and agrees with the treatment plan   Counseling Documentation With Imm: The patient was counseled regarding diagnostic results, instructions for management  Follow Up Instructions: Follow Up with your Primary Care Provider in Ortho days  If your symptoms worsen, go to the nearest [place] [address] Emergency Department  Chief Complaint    1  Foot Problem  Chief Complaint Free Text Note Form: yesterday a cabinet dropped on his right foot injuring his right big toe      History of Present Illness  HPI: 61-year-old male with injury right great toe   Drop file cabinet on it and then stubbed it later in the day yesterday  pain, swelling, limited range of motion and hurts to walk      Review of Systems  Focused-Male:   Constitutional: no fever or chills, feels well, no tiredness, no recent weight loss or weight gain  Musculoskeletal: as noted in HPI  Active Problems    1  Abdominal pain (789 00) (R10 9)   2  Allergic rhinitis (477 9) (J30 9)   3  Anxiety (300 00) (F41 9)   4  Axillary lymphadenopathy (785 6) (R59 0)   5  Bipolar disorder (296 80) (F31 9)   6  Chills (780 64) (R68 83)   7  Constipation (564 00) (K59 00)   8  Decreased appetite (783 0) (R63 0)   9  Disc degeneration, lumbar (722 52) (M51 36)   10  Enteritis, infectious (009 0) (A09)   11  Fatigue (780 79) (R53 83)   12  Fibromyalgia (729 1) (M79 7)   13  ESEQUIEL (generalized anxiety disorder) (300 02) (F41 1)   14  High risk medication use (V58 69) (Z79 899)   15  Hypoglycemia (251 2) (E16 2)   16  Insomnia (780 52) (G47 00)   17  Low back pain (724 2) (M54 5)   18  Social phobia (300 23) (F40 10)   19  Sore throat (462) (J02 9)   20  Suicidal deliberate poisoning (989 9,E950 9) (T65 92XA)   21  Vitamin D deficiency (268 9) (E55 9)    Past Medical History    1  Denied: History of concussion   2  No pertinent past medical history   3  History of Opioid dependence (304 00) (F11 20)   4  Denied: History of Seizures  Active Problems And Past Medical History Reviewed: The active problems and past medical history were reviewed and updated today  Family History  Father    1  Family history of Depression with anxiety   2  Family history of depression (V17 0) (Z81 8)  Maternal Aunt    3  Family history of bipolar disorder (V17 0) (Z81 8)  Family History    4  Denied: Family history of substance abuse   5  Denied: Family history of suicide  Family History Reviewed: The family history was reviewed and updated today         Social History    · Current every day smoker (305 1) (F17 200)   · No alcohol use   · No caffeine use   · Uses marijuana (305 20) (F12 90)  Social History Reviewed: The social history was reviewed and updated today  Surgical History    1  History of Closed Treatment Of Clavicular Fracture  Surgical History Reviewed: The surgical history was reviewed and updated today  Current Meds   1  EpiPen 2-Eleuterio 0 3 MG/0 3ML Injection Solution Auto-injector; Therapy: 37RXJ4979 to (Evaluate:17Nov2014) Recorded   2  Methadone HCl Intensol 10 MG/ML Oral Concentrate; TAKE 100MG DAILY; Therapy: 11OHC0835 to Recorded  Medication List Reviewed: The medication list was reviewed and updated today  Allergies    1  No Known Drug Allergies    Vitals  Signs   Recorded: 46LNN2139 11:24AM   Heart Rate: 66  Respiration: 20  Systolic: 242  Diastolic: 76  Height: 5 ft 9 in  Weight: 142 lb   BMI Calculated: 20 97  BSA Calculated: 1 79  O2 Saturation: 99  Pain Scale: 10    Physical Exam    Constitutional well-developed well-nourished male in no acute distress while sitting  Pulmonary   Respiratory effort: No increased work of breathing or signs of respiratory distress  Musculoskeletal antalgic  mild swelling base of right great toe with tenderness to palpation distal aspect to 1st MTP with limited range of motion  Skin small abrasion over the dorsal aspect of the right great toe  Results/Data  * XR FOOT 3+ VIEW RIGHT 29Oct2017 11:34AM [facility] [id number]     Test Name Result Flag Reference   XR FOOT 3+ VW RIGHT (Report)     RIGHT FOOT     INDICATION: Injury with cavity  COMPARISON: None     VIEWS: 3     IMAGES: 3     FINDINGS:     There is a small ossific fragment adjacent to the distal aspect of the proximal phalanx suggesting underlying injury  No additional fractures were seen  No degenerative changes  No lytic or blastic lesions are seen  Soft tissues are unremarkable  IMPRESSION:     Avulsion fracture distal aspect proximal phalanx 1st digit                Workstation performed: [id number]     Signed by:   Barney Orellana MD   10/29/17                             Diagnostic Studies Reviewed: I personally reviewed the films/images/results in the office today  My interpretation follows  X-ray Review Right foot: Chip fracture noted at distal aspect of proximal phalanx  Message  Return to work or school:   Loretta Macias is under my professional care  He was seen in my office on 10/29/17    He is not able to return to work until 11/06/2017     Follow with primary care or orthopedist         Signatures   Electronically signed by :  Jessica Ojeda, [facility]; Oct 29 2017 11:54AM EST                       (Author)

## 2018-01-18 NOTE — MISCELLANEOUS
Message  Return to work or school:   Arik Azar is under my professional care  He was seen in my office on 10/29/17    He is not able to return to work until 11/06/2017     Follow with primary care or orthopedist         Signatures   Electronically signed by :  Aaron Valdez, [address]; Oct 29 2017 11:54AM EST                       (Author)

## 2018-01-22 VITALS
SYSTOLIC BLOOD PRESSURE: 144 MMHG | DIASTOLIC BLOOD PRESSURE: 90 MMHG | BODY MASS INDEX: 21.71 KG/M2 | WEIGHT: 147 LBS | HEART RATE: 112 BPM

## 2018-01-22 VITALS
BODY MASS INDEX: 21.09 KG/M2 | DIASTOLIC BLOOD PRESSURE: 64 MMHG | WEIGHT: 142.38 LBS | SYSTOLIC BLOOD PRESSURE: 128 MMHG | HEART RATE: 86 BPM | HEIGHT: 69 IN | TEMPERATURE: 97.6 F

## 2018-03-07 NOTE — PSYCH
History of Present Illness    Presenting Problems: Stressors: PATIENT HAS DEPRERSSION WITH HIGH MOODS AND SUICIDAL IDEATION  Referral Source: Nick Nunes  He is not employed  He is not a smoker  Symptoms: suicidal ideation, no self abusive behaviors, no homicidal thoughts, no history of violence toward others, depressed mood, anxiety, no psychosis, no medication noncompliance, sleep disturbances, no change in appetite, agitation, no hypomania and POOR CONCENTRATION  AND CONFUSION   Provisional Diagnosis: Axis I: BI POLAR  Substance Abuse: heroin, THC, 6 MONTHS AGO  Psychiatric Treatment History: DRUG REHAB, Current Psychiatrist: DR Humble Iqbal and Therapist: Cierra Thomas   The patient does not require ambulatory assistance  Legal Issues: The patient does not have legal issues  Action: Record received and Laboratory work received  ACCEPTED  Appointment Date: 03/03/2017        Signatures   Electronically signed by : July Espana, ; Mar  1 2017  1:35PM EST                       (Author)

## 2019-07-03 ENCOUNTER — ANESTHESIA (OUTPATIENT)
Dept: PERIOP | Facility: HOSPITAL | Age: 29
End: 2019-07-03
Payer: COMMERCIAL

## 2019-07-03 ENCOUNTER — HOSPITAL ENCOUNTER (OUTPATIENT)
Facility: HOSPITAL | Age: 29
Setting detail: OUTPATIENT SURGERY
Discharge: HOME | End: 2019-07-03
Attending: EMERGENCY MEDICINE | Admitting: SURGERY
Payer: COMMERCIAL

## 2019-07-03 ENCOUNTER — ANESTHESIA EVENT (OUTPATIENT)
Dept: PERIOP | Facility: HOSPITAL | Age: 29
End: 2019-07-03
Payer: COMMERCIAL

## 2019-07-03 ENCOUNTER — APPOINTMENT (EMERGENCY)
Dept: CT IMAGING | Facility: HOSPITAL | Age: 29
End: 2019-07-03
Payer: COMMERCIAL

## 2019-07-03 VITALS
TEMPERATURE: 98.5 F | SYSTOLIC BLOOD PRESSURE: 132 MMHG | DIASTOLIC BLOOD PRESSURE: 70 MMHG | OXYGEN SATURATION: 95 % | BODY MASS INDEX: 21.02 KG/M2 | WEIGHT: 146.83 LBS | HEIGHT: 70 IN | RESPIRATION RATE: 17 BRPM | HEART RATE: 79 BPM

## 2019-07-03 DIAGNOSIS — K35.80 APPENDICITIS, ACUTE: Primary | ICD-10-CM

## 2019-07-03 LAB
ALBUMIN SERPL BCP-MCNC: 4.3 G/DL (ref 3.5–5)
ALP SERPL-CCNC: 59 U/L (ref 46–116)
ALT SERPL W P-5'-P-CCNC: 22 U/L (ref 12–78)
ANION GAP SERPL CALCULATED.3IONS-SCNC: 9 MMOL/L (ref 4–13)
APTT PPP: 31 SECONDS (ref 23–37)
AST SERPL W P-5'-P-CCNC: 13 U/L (ref 5–45)
BASOPHILS # BLD AUTO: 0.03 THOUSANDS/ΜL (ref 0–0.1)
BASOPHILS NFR BLD AUTO: 0 % (ref 0–1)
BILIRUB SERPL-MCNC: 0.5 MG/DL (ref 0.2–1)
BILIRUB UR QL STRIP: NEGATIVE
BUN SERPL-MCNC: 9 MG/DL (ref 5–25)
CALCIUM SERPL-MCNC: 8.9 MG/DL (ref 8.3–10.1)
CHLORIDE SERPL-SCNC: 100 MMOL/L (ref 100–108)
CLARITY UR: CLEAR
CO2 SERPL-SCNC: 29 MMOL/L (ref 21–32)
COLOR UR: YELLOW
CREAT SERPL-MCNC: 0.95 MG/DL (ref 0.6–1.3)
EOSINOPHIL # BLD AUTO: 0.04 THOUSAND/ΜL (ref 0–0.61)
EOSINOPHIL NFR BLD AUTO: 0 % (ref 0–6)
ERYTHROCYTE [DISTWIDTH] IN BLOOD BY AUTOMATED COUNT: 12.4 % (ref 11.6–15.1)
GFR SERPL CREATININE-BSD FRML MDRD: 109 ML/MIN/1.73SQ M
GLUCOSE SERPL-MCNC: 94 MG/DL (ref 65–140)
GLUCOSE UR STRIP-MCNC: NEGATIVE MG/DL
HCT VFR BLD AUTO: 41.5 % (ref 36.5–49.3)
HGB BLD-MCNC: 14.2 G/DL (ref 12–17)
HGB UR QL STRIP.AUTO: NEGATIVE
IMM GRANULOCYTES # BLD AUTO: 0.04 THOUSAND/UL (ref 0–0.2)
IMM GRANULOCYTES NFR BLD AUTO: 0 % (ref 0–2)
INR PPP: 1.12 (ref 0.84–1.19)
KETONES UR STRIP-MCNC: NEGATIVE MG/DL
LEUKOCYTE ESTERASE UR QL STRIP: NEGATIVE
LIPASE SERPL-CCNC: 47 U/L (ref 73–393)
LYMPHOCYTES # BLD AUTO: 2.08 THOUSANDS/ΜL (ref 0.6–4.47)
LYMPHOCYTES NFR BLD AUTO: 15 % (ref 14–44)
MCH RBC QN AUTO: 30.5 PG (ref 26.8–34.3)
MCHC RBC AUTO-ENTMCNC: 34.2 G/DL (ref 31.4–37.4)
MCV RBC AUTO: 89 FL (ref 82–98)
MONOCYTES # BLD AUTO: 0.74 THOUSAND/ΜL (ref 0.17–1.22)
MONOCYTES NFR BLD AUTO: 5 % (ref 4–12)
NEUTROPHILS # BLD AUTO: 10.84 THOUSANDS/ΜL (ref 1.85–7.62)
NEUTS SEG NFR BLD AUTO: 80 % (ref 43–75)
NITRITE UR QL STRIP: NEGATIVE
NRBC BLD AUTO-RTO: 0 /100 WBCS
PH UR STRIP.AUTO: 7.5 [PH] (ref 4.5–8)
PLATELET # BLD AUTO: 353 THOUSANDS/UL (ref 149–390)
PMV BLD AUTO: 9.6 FL (ref 8.9–12.7)
POTASSIUM SERPL-SCNC: 4 MMOL/L (ref 3.5–5.3)
PROT SERPL-MCNC: 7.7 G/DL (ref 6.4–8.2)
PROT UR STRIP-MCNC: NEGATIVE MG/DL
PROTHROMBIN TIME: 13.8 SECONDS (ref 11.6–14.5)
RBC # BLD AUTO: 4.66 MILLION/UL (ref 3.88–5.62)
SODIUM SERPL-SCNC: 138 MMOL/L (ref 136–145)
SP GR UR STRIP.AUTO: 1.01 (ref 1–1.03)
UROBILINOGEN UR QL STRIP.AUTO: 0.2 E.U./DL
WBC # BLD AUTO: 13.77 THOUSAND/UL (ref 4.31–10.16)

## 2019-07-03 PROCEDURE — 85025 COMPLETE CBC W/AUTO DIFF WBC: CPT | Performed by: PHYSICIAN ASSISTANT

## 2019-07-03 PROCEDURE — 74177 CT ABD & PELVIS W/CONTRAST: CPT

## 2019-07-03 PROCEDURE — 85730 THROMBOPLASTIN TIME PARTIAL: CPT | Performed by: PHYSICIAN ASSISTANT

## 2019-07-03 PROCEDURE — 85610 PROTHROMBIN TIME: CPT | Performed by: PHYSICIAN ASSISTANT

## 2019-07-03 PROCEDURE — 88304 TISSUE EXAM BY PATHOLOGIST: CPT | Performed by: PATHOLOGY

## 2019-07-03 PROCEDURE — 44970 LAPAROSCOPY APPENDECTOMY: CPT | Performed by: SURGERY

## 2019-07-03 PROCEDURE — 36415 COLL VENOUS BLD VENIPUNCTURE: CPT | Performed by: PHYSICIAN ASSISTANT

## 2019-07-03 PROCEDURE — 81003 URINALYSIS AUTO W/O SCOPE: CPT

## 2019-07-03 PROCEDURE — 99284 EMERGENCY DEPT VISIT MOD MDM: CPT | Performed by: PHYSICIAN ASSISTANT

## 2019-07-03 PROCEDURE — 83690 ASSAY OF LIPASE: CPT | Performed by: PHYSICIAN ASSISTANT

## 2019-07-03 PROCEDURE — 99285 EMERGENCY DEPT VISIT HI MDM: CPT

## 2019-07-03 PROCEDURE — 96360 HYDRATION IV INFUSION INIT: CPT

## 2019-07-03 PROCEDURE — 80053 COMPREHEN METABOLIC PANEL: CPT | Performed by: PHYSICIAN ASSISTANT

## 2019-07-03 RX ORDER — SODIUM CHLORIDE 9 MG/ML
INJECTION, SOLUTION INTRAVENOUS AS NEEDED
Status: DISCONTINUED | OUTPATIENT
Start: 2019-07-03 | End: 2019-07-03 | Stop reason: HOSPADM

## 2019-07-03 RX ORDER — KETOROLAC TROMETHAMINE 30 MG/ML
15 INJECTION, SOLUTION INTRAMUSCULAR; INTRAVENOUS EVERY 6 HOURS PRN
Status: DISCONTINUED | OUTPATIENT
Start: 2019-07-03 | End: 2019-07-03 | Stop reason: HOSPADM

## 2019-07-03 RX ORDER — PROPOFOL 10 MG/ML
INJECTION, EMULSION INTRAVENOUS AS NEEDED
Status: DISCONTINUED | OUTPATIENT
Start: 2019-07-03 | End: 2019-07-03 | Stop reason: SURG

## 2019-07-03 RX ORDER — DIPHENHYDRAMINE HYDROCHLORIDE 50 MG/ML
12.5 INJECTION INTRAMUSCULAR; INTRAVENOUS ONCE AS NEEDED
Status: DISCONTINUED | OUTPATIENT
Start: 2019-07-03 | End: 2019-07-03 | Stop reason: HOSPADM

## 2019-07-03 RX ORDER — CEFAZOLIN SODIUM 1 G/50ML
1000 SOLUTION INTRAVENOUS ONCE
Status: COMPLETED | OUTPATIENT
Start: 2019-07-03 | End: 2019-07-03

## 2019-07-03 RX ORDER — SODIUM CHLORIDE 9 MG/ML
125 INJECTION, SOLUTION INTRAVENOUS CONTINUOUS
Status: DISCONTINUED | OUTPATIENT
Start: 2019-07-03 | End: 2019-07-03 | Stop reason: HOSPADM

## 2019-07-03 RX ORDER — ACETAMINOPHEN 325 MG/1
650 TABLET ORAL EVERY 6 HOURS PRN
Status: DISCONTINUED | OUTPATIENT
Start: 2019-07-03 | End: 2019-07-03 | Stop reason: HOSPADM

## 2019-07-03 RX ORDER — HYDROMORPHONE HCL/PF 1 MG/ML
0.2 SYRINGE (ML) INJECTION
Status: DISCONTINUED | OUTPATIENT
Start: 2019-07-03 | End: 2019-07-03 | Stop reason: HOSPADM

## 2019-07-03 RX ORDER — METOCLOPRAMIDE HYDROCHLORIDE 5 MG/ML
10 INJECTION INTRAMUSCULAR; INTRAVENOUS ONCE AS NEEDED
Status: DISCONTINUED | OUTPATIENT
Start: 2019-07-03 | End: 2019-07-03 | Stop reason: HOSPADM

## 2019-07-03 RX ORDER — ONDANSETRON 2 MG/ML
4 INJECTION INTRAMUSCULAR; INTRAVENOUS EVERY 6 HOURS PRN
Status: DISCONTINUED | OUTPATIENT
Start: 2019-07-03 | End: 2019-07-03

## 2019-07-03 RX ORDER — ONDANSETRON 2 MG/ML
4 INJECTION INTRAMUSCULAR; INTRAVENOUS EVERY 6 HOURS PRN
Status: DISCONTINUED | OUTPATIENT
Start: 2019-07-03 | End: 2019-07-03 | Stop reason: HOSPADM

## 2019-07-03 RX ORDER — HYDROMORPHONE HCL/PF 1 MG/ML
0.5 SYRINGE (ML) INJECTION
Status: DISCONTINUED | OUTPATIENT
Start: 2019-07-03 | End: 2019-07-03

## 2019-07-03 RX ORDER — CEFAZOLIN SODIUM 2 G/50ML
2000 SOLUTION INTRAVENOUS
Status: DISCONTINUED | OUTPATIENT
Start: 2019-07-03 | End: 2019-07-03

## 2019-07-03 RX ORDER — CEFAZOLIN SODIUM 2 G/50ML
2000 SOLUTION INTRAVENOUS ONCE
Status: COMPLETED | OUTPATIENT
Start: 2019-07-03 | End: 2019-07-03

## 2019-07-03 RX ORDER — ONDANSETRON 2 MG/ML
4 INJECTION INTRAMUSCULAR; INTRAVENOUS EVERY 4 HOURS PRN
Status: DISCONTINUED | OUTPATIENT
Start: 2019-07-03 | End: 2019-07-03

## 2019-07-03 RX ORDER — ROCURONIUM BROMIDE 10 MG/ML
INJECTION, SOLUTION INTRAVENOUS AS NEEDED
Status: DISCONTINUED | OUTPATIENT
Start: 2019-07-03 | End: 2019-07-03 | Stop reason: SURG

## 2019-07-03 RX ORDER — KETAMINE HCL IN NACL, ISO-OSM 100MG/10ML
SYRINGE (ML) INJECTION AS NEEDED
Status: DISCONTINUED | OUTPATIENT
Start: 2019-07-03 | End: 2019-07-03 | Stop reason: SURG

## 2019-07-03 RX ORDER — SODIUM CHLORIDE 9 MG/ML
125 INJECTION, SOLUTION INTRAVENOUS CONTINUOUS
Status: DISCONTINUED | OUTPATIENT
Start: 2019-07-03 | End: 2019-07-03

## 2019-07-03 RX ORDER — NICOTINE 21 MG/24HR
1 PATCH, TRANSDERMAL 24 HOURS TRANSDERMAL DAILY
Status: DISCONTINUED | OUTPATIENT
Start: 2019-07-04 | End: 2019-07-03 | Stop reason: HOSPADM

## 2019-07-03 RX ORDER — ONDANSETRON 2 MG/ML
INJECTION INTRAMUSCULAR; INTRAVENOUS AS NEEDED
Status: DISCONTINUED | OUTPATIENT
Start: 2019-07-03 | End: 2019-07-03 | Stop reason: SURG

## 2019-07-03 RX ORDER — HYDROMORPHONE HCL/PF 1 MG/ML
0.5 SYRINGE (ML) INJECTION
Status: DISCONTINUED | OUTPATIENT
Start: 2019-07-03 | End: 2019-07-03 | Stop reason: HOSPADM

## 2019-07-03 RX ORDER — FENTANYL CITRATE/PF 50 MCG/ML
50 SYRINGE (ML) INJECTION
Status: DISCONTINUED | OUTPATIENT
Start: 2019-07-03 | End: 2019-07-03 | Stop reason: HOSPADM

## 2019-07-03 RX ORDER — FENTANYL CITRATE 50 UG/ML
INJECTION, SOLUTION INTRAMUSCULAR; INTRAVENOUS AS NEEDED
Status: DISCONTINUED | OUTPATIENT
Start: 2019-07-03 | End: 2019-07-03 | Stop reason: SURG

## 2019-07-03 RX ORDER — BUPIVACAINE HYDROCHLORIDE AND EPINEPHRINE 2.5; 5 MG/ML; UG/ML
INJECTION, SOLUTION EPIDURAL; INFILTRATION; INTRACAUDAL; PERINEURAL AS NEEDED
Status: DISCONTINUED | OUTPATIENT
Start: 2019-07-03 | End: 2019-07-03 | Stop reason: HOSPADM

## 2019-07-03 RX ORDER — NEOSTIGMINE METHYLSULFATE 1 MG/ML
INJECTION INTRAVENOUS AS NEEDED
Status: DISCONTINUED | OUTPATIENT
Start: 2019-07-03 | End: 2019-07-03 | Stop reason: SURG

## 2019-07-03 RX ORDER — METHADONE HYDROCHLORIDE 10 MG/ML
100 CONCENTRATE ORAL DAILY
Status: DISCONTINUED | OUTPATIENT
Start: 2019-07-04 | End: 2019-07-03 | Stop reason: HOSPADM

## 2019-07-03 RX ORDER — KETOROLAC TROMETHAMINE 30 MG/ML
INJECTION, SOLUTION INTRAMUSCULAR; INTRAVENOUS AS NEEDED
Status: DISCONTINUED | OUTPATIENT
Start: 2019-07-03 | End: 2019-07-03 | Stop reason: SURG

## 2019-07-03 RX ORDER — DEXAMETHASONE SODIUM PHOSPHATE 4 MG/ML
INJECTION, SOLUTION INTRA-ARTICULAR; INTRALESIONAL; INTRAMUSCULAR; INTRAVENOUS; SOFT TISSUE AS NEEDED
Status: DISCONTINUED | OUTPATIENT
Start: 2019-07-03 | End: 2019-07-03 | Stop reason: SURG

## 2019-07-03 RX ORDER — ONDANSETRON 2 MG/ML
4 INJECTION INTRAMUSCULAR; INTRAVENOUS ONCE AS NEEDED
Status: DISCONTINUED | OUTPATIENT
Start: 2019-07-03 | End: 2019-07-03 | Stop reason: HOSPADM

## 2019-07-03 RX ORDER — LIDOCAINE HYDROCHLORIDE 10 MG/ML
0.5 INJECTION, SOLUTION EPIDURAL; INFILTRATION; INTRACAUDAL; PERINEURAL ONCE AS NEEDED
Status: DISCONTINUED | OUTPATIENT
Start: 2019-07-03 | End: 2019-07-03 | Stop reason: HOSPADM

## 2019-07-03 RX ORDER — GLYCOPYRROLATE 0.2 MG/ML
INJECTION INTRAMUSCULAR; INTRAVENOUS AS NEEDED
Status: DISCONTINUED | OUTPATIENT
Start: 2019-07-03 | End: 2019-07-03 | Stop reason: SURG

## 2019-07-03 RX ORDER — SUCCINYLCHOLINE/SOD CL,ISO/PF 100 MG/5ML
SYRINGE (ML) INTRAVENOUS AS NEEDED
Status: DISCONTINUED | OUTPATIENT
Start: 2019-07-03 | End: 2019-07-03 | Stop reason: SURG

## 2019-07-03 RX ORDER — DEXMEDETOMIDINE HYDROCHLORIDE 100 UG/ML
INJECTION, SOLUTION INTRAVENOUS AS NEEDED
Status: DISCONTINUED | OUTPATIENT
Start: 2019-07-03 | End: 2019-07-03 | Stop reason: SURG

## 2019-07-03 RX ORDER — SODIUM CHLORIDE, SODIUM LACTATE, POTASSIUM CHLORIDE, CALCIUM CHLORIDE 600; 310; 30; 20 MG/100ML; MG/100ML; MG/100ML; MG/100ML
INJECTION, SOLUTION INTRAVENOUS CONTINUOUS PRN
Status: DISCONTINUED | OUTPATIENT
Start: 2019-07-03 | End: 2019-07-03 | Stop reason: SURG

## 2019-07-03 RX ADMIN — FENTANYL CITRATE 50 MCG: 50 INJECTION, SOLUTION INTRAMUSCULAR; INTRAVENOUS at 18:38

## 2019-07-03 RX ADMIN — DEXMEDETOMIDINE HYDROCHLORIDE 12 MCG: 100 INJECTION, SOLUTION INTRAVENOUS at 17:43

## 2019-07-03 RX ADMIN — KETOROLAC TROMETHAMINE 30 MG: 30 INJECTION, SOLUTION INTRAMUSCULAR at 18:15

## 2019-07-03 RX ADMIN — METRONIDAZOLE 500 MG: 500 INJECTION, SOLUTION INTRAVENOUS at 16:43

## 2019-07-03 RX ADMIN — CEFAZOLIN SODIUM 1000 MG: 1 SOLUTION INTRAVENOUS at 17:23

## 2019-07-03 RX ADMIN — SODIUM CHLORIDE 1000 ML: 0.9 INJECTION, SOLUTION INTRAVENOUS at 13:51

## 2019-07-03 RX ADMIN — HYDROMORPHONE HYDROCHLORIDE 0.2 MG: 1 INJECTION, SOLUTION INTRAMUSCULAR; INTRAVENOUS; SUBCUTANEOUS at 18:48

## 2019-07-03 RX ADMIN — SODIUM CHLORIDE, SODIUM LACTATE, POTASSIUM CHLORIDE, AND CALCIUM CHLORIDE: .6; .31; .03; .02 INJECTION, SOLUTION INTRAVENOUS at 16:45

## 2019-07-03 RX ADMIN — GLYCOPYRROLATE 0.8 MG: 0.2 INJECTION, SOLUTION INTRAMUSCULAR; INTRAVENOUS at 18:17

## 2019-07-03 RX ADMIN — LIDOCAINE HYDROCHLORIDE 100 MG: 20 INJECTION, SOLUTION INTRAVENOUS at 17:31

## 2019-07-03 RX ADMIN — PROPOFOL 200 MG: 10 INJECTION, EMULSION INTRAVENOUS at 17:31

## 2019-07-03 RX ADMIN — FENTANYL CITRATE 50 MCG: 50 INJECTION, SOLUTION INTRAMUSCULAR; INTRAVENOUS at 18:45

## 2019-07-03 RX ADMIN — SODIUM CHLORIDE 125 ML/HR: 0.9 INJECTION, SOLUTION INTRAVENOUS at 16:43

## 2019-07-03 RX ADMIN — Medication 50 MG: at 17:31

## 2019-07-03 RX ADMIN — NEOSTIGMINE METHYLSULFATE 4 MG: 1 INJECTION INTRAVENOUS at 18:17

## 2019-07-03 RX ADMIN — HYDROMORPHONE HYDROCHLORIDE 0.5 MG: 1 INJECTION, SOLUTION INTRAMUSCULAR; INTRAVENOUS; SUBCUTANEOUS at 19:00

## 2019-07-03 RX ADMIN — METRONIDAZOLE 500 MG: 500 INJECTION, SOLUTION INTRAVENOUS at 17:36

## 2019-07-03 RX ADMIN — FENTANYL CITRATE 100 MCG: 50 INJECTION INTRAMUSCULAR; INTRAVENOUS at 17:31

## 2019-07-03 RX ADMIN — IOHEXOL 100 ML: 350 INJECTION, SOLUTION INTRAVENOUS at 14:46

## 2019-07-03 RX ADMIN — ONDANSETRON 4 MG: 2 INJECTION INTRAMUSCULAR; INTRAVENOUS at 17:34

## 2019-07-03 RX ADMIN — HYDROMORPHONE HYDROCHLORIDE 0.2 MG: 1 INJECTION, SOLUTION INTRAMUSCULAR; INTRAVENOUS; SUBCUTANEOUS at 18:54

## 2019-07-03 RX ADMIN — DEXAMETHASONE SODIUM PHOSPHATE 4 MG: 4 INJECTION, SOLUTION INTRAMUSCULAR; INTRAVENOUS at 17:34

## 2019-07-03 RX ADMIN — DEXMEDETOMIDINE HYDROCHLORIDE 8 MCG: 100 INJECTION, SOLUTION INTRAVENOUS at 17:49

## 2019-07-03 RX ADMIN — ROCURONIUM BROMIDE 25 MG: 10 INJECTION, SOLUTION INTRAVENOUS at 17:43

## 2019-07-03 RX ADMIN — KETOROLAC TROMETHAMINE 15 MG: 30 INJECTION, SOLUTION INTRAMUSCULAR at 19:56

## 2019-07-03 RX ADMIN — DEXMEDETOMIDINE HYDROCHLORIDE 12 MCG: 100 INJECTION, SOLUTION INTRAVENOUS at 17:34

## 2019-07-03 RX ADMIN — Medication 100 MG: at 17:31

## 2019-07-03 RX ADMIN — CEFAZOLIN SODIUM 2000 MG: 2 SOLUTION INTRAVENOUS at 15:52

## 2019-07-03 NOTE — ED PROVIDER NOTES
History  Chief Complaint   Patient presents with    Abdominal Pain     RLQ abd pain, N/V, sweating and chills  Pt reports "It might be my appendix "       History provided by:  Patient  Abdominal Pain   Pain location:  Epigastric and RLQ  Pain quality: sharp    Pain radiates to:  RLQ  Pain severity:  Moderate  Onset quality:  Gradual  Duration:  3 days  Timing:  Constant  Progression:  Waxing and waning  Chronicity:  New  Context: not alcohol use, not awakening from sleep, not diet changes, not eating, not laxative use, not medication withdrawal, not previous surgeries, not recent illness, not recent sexual activity, not recent travel, not retching, not sick contacts, not suspicious food intake and not trauma    Relieved by:  None tried  Worsened by:  Nothing  Ineffective treatments:  None tried  Associated symptoms: anorexia, hematochezia and nausea    Associated symptoms: no belching, no chest pain, no chills, no constipation, no cough, no diarrhea, no dysuria, no fatigue, no fever, no flatus, no hematemesis, no hematuria, no melena, no shortness of breath, no sore throat and no vomiting    Risk factors: no alcohol abuse, no aspirin use, not elderly, has not had multiple surgeries, no NSAID use, not obese and no recent hospitalization        Prior to Admission Medications   Prescriptions Last Dose Informant Patient Reported? Taking? EPINEPHrine (EPIPEN 2-RUFINA) 0 3 mg/0 3 mL SOAJ   Yes Yes   Sig: Inject as directed   methadone (DOLOPHINE) 10 mg/mL oral concentrated solution   Yes Yes   Sig: Take by mouth      Facility-Administered Medications: None       Past Medical History:   Diagnosis Date    Bipolar 1 disorder ([facility] )     Depression     Opiate addiction ([place] [address] )     Psychiatric disorder        Past Surgical History:   Procedure Laterality Date    CLAVICLE FRACTURE REPAIR      closed treatment    ESOPHAGOGASTRODUODENOSCOPY N/A 8/17/2016    Procedure: ESOPHAGOGASTRODUODENOSCOPY (EGD);   Surgeon: Giancarlo Recinos MD;  Location: [facility]; Service:        Family History   Problem Relation Age of Onset    Depression Father     Anxiety disorder Father     Bipolar disorder Maternal Aunt      I have reviewed and agree with the history as documented  Social History     Tobacco Use    Smoking status: Current Every Day Smoker     Packs/day: 0 50    Smokeless tobacco: Never Used   Substance Use Topics    Alcohol use: No    Drug use: Yes     Types: Marijuana     Comment: last use of heroin Feb 6th 2016  Medical Marijuana        Review of Systems   Constitutional: Positive for activity change and appetite change  Negative for chills, fatigue and fever  HENT: Negative for congestion, dental problem, ear pain, mouth sores, postnasal drip, rhinorrhea and sore throat  Eyes: Negative for pain, discharge, redness and itching  Respiratory: Negative for cough, chest tightness and shortness of breath  Cardiovascular: Negative for chest pain  Gastrointestinal: Positive for abdominal pain, anorexia, blood in stool, hematochezia and nausea  Negative for constipation, diarrhea, flatus, hematemesis, melena and vomiting  Genitourinary: Negative for dysuria, flank pain, frequency, hematuria and urgency  Skin: Negative for color change, pallor and rash  Psychiatric/Behavioral: Negative for confusion  All other systems reviewed and are negative  Physical Exam  Physical Exam   Constitutional: He is oriented to person, place, and time  He appears well-developed and well-nourished  Non-toxic appearance  He does not appear ill  No distress  HENT:   Head: Normocephalic  Cardiovascular: Normal rate, regular rhythm and normal heart sounds  Exam reveals no gallop  No murmur heard  Pulmonary/Chest: Effort normal and breath sounds normal  No stridor  No respiratory distress  He has no wheezes  He has no rhonchi  He has no rales  He exhibits no tenderness  Abdominal: Normal appearance  There is no hepatosplenomegaly  There is tenderness in the right lower quadrant  There is guarding and tenderness at McBurney's point  There is no rigidity  Neurological: He is alert and oriented to person, place, and time  Skin: Skin is warm  Capillary refill takes less than 2 seconds  He is not diaphoretic  Psychiatric: He has a normal mood and affect  His behavior is normal    Nursing note and vitals reviewed        Vital Signs  ED Triage Vitals [07/03/19 1315]   Temperature Pulse Respirations Blood Pressure SpO2   98 1 °F (36 7 °C) (!) 130 18 146/79 97 %      Temp Source Heart Rate Source Patient Position - Orthostatic VS BP Location FiO2 (%)   Oral Monitor Lying Right arm --      Pain Score       --           Vitals:    07/03/19 1315 07/03/19 1430 07/03/19 1525   BP: 146/79 110/66 119/57   Pulse: (!) 130 84 71   Patient Position - Orthostatic VS: Lying  Lying         Visual Acuity      ED Medications  Medications   ceFAZolin (ANCEF) IVPB (premix) 2,000 mg (2,000 mg Intravenous New Bag 7/3/19 1552)   metroNIDAZOLE (FLAGYL) IVPB (premix) 500 mg (has no administration in time range)   sodium chloride 0 9 % infusion (has no administration in time range)   sodium chloride 0 9 % bolus 1,000 mL (0 mL Intravenous Stopped 7/3/19 1451)   iohexol (OMNIPAQUE) 350 MG/ML injection (SINGLE-DOSE) 100 mL (100 mL Intravenous Given 7/3/19 1446)       Diagnostic Studies  Results Reviewed     Procedure Component Value Units Date/Time    Protime-INR [[id number]]  (Normal) Collected:  07/03/19 1348    Lab Status:  Final result Specimen:  Blood from Arm, Right Updated:  07/03/19 1445     Protime 13 8 seconds      INR 1 12    APTT [[id number]]  (Normal) Collected:  07/03/19 1348    Lab Status:  Final result Specimen:  Blood from Arm, Right Updated:  07/03/19 1445     PTT 31 seconds     Comprehensive metabolic panel [[id number]] Collected:  07/03/19 1348    Lab Status:  Final result Specimen:  Blood from Arm, Right Updated:  07/03/19 1416     Sodium 138 mmol/L Potassium 4 0 mmol/L      Chloride 100 mmol/L      CO2 29 mmol/L      ANION GAP 9 mmol/L      BUN 9 mg/dL      Creatinine 0 95 mg/dL      Glucose 94 mg/dL      Calcium 8 9 mg/dL      AST 13 U/L      ALT 22 U/L      Alkaline Phosphatase 59 U/L      Total Protein 7 7 g/dL      Albumin 4 3 g/dL      Total Bilirubin 0 50 mg/dL      eGFR 109 ml/min/1 73sq m     Narrative:       National Kidney Disease Foundation guidelines for Chronic Kidney Disease (CKD):     Stage 1 with normal or high GFR (GFR > 90 mL/min/1 73 square meters)    Stage 2 Mild CKD (GFR = 60-89 mL/min/1 73 square meters)    Stage 3A Moderate CKD (GFR = 45-59 mL/min/1 73 square meters)    Stage 3B Moderate CKD (GFR = 30-44 mL/min/1 73 square meters)    Stage 4 Severe CKD (GFR = 15-29 mL/min/1 73 square meters)    Stage 5 End Stage CKD (GFR <15 mL/min/1 73 square meters)  Note: GFR calculation is accurate only with a steady state creatinine    Lipase [[id number]]  (Abnormal) Collected:  07/03/19 1348    Lab Status:  Final result Specimen:  Blood from Arm, Right Updated:  07/03/19 1416     Lipase 47 u/L     CBC and differential [[id number]]  (Abnormal) Collected:  07/03/19 1348    Lab Status:  Final result Specimen:  Blood from Arm, Right Updated:  07/03/19 1358     WBC 13 77 Thousand/uL      RBC 4 66 Million/uL      Hemoglobin 14 2 g/dL      Hematocrit 41 5 %      MCV 89 fL      MCH 30 5 pg      MCHC 34 2 g/dL      RDW 12 4 %      MPV 9 6 fL      Platelets 801 Thousands/uL      nRBC 0 /100 WBCs      Neutrophils Relative 80 %      Immat GRANS % 0 %      Lymphocytes Relative 15 %      Monocytes Relative 5 %      Eosinophils Relative 0 %      Basophils Relative 0 %      Neutrophils Absolute 10 84 Thousands/µL      Immature Grans Absolute 0 04 Thousand/uL      Lymphocytes Absolute 2 08 Thousands/µL      Monocytes Absolute 0 74 Thousand/µL      Eosinophils Absolute 0 04 Thousand/µL      Basophils Absolute 0 03 Thousands/µL                  CT abdomen pelvis with contrast   ED Interpretation by Gt Cueto PA-C (07/03 4738)   Early appendicitis      Final Result by Katiana Banks MD (07/03 8653)      Enlarged thick-walled appendix, as described above and most compatible with early acute appendicitis  No extraluminal free air or abscess formation identified  I personally discussed this study with Jyoti Scottkylee on 7/3/2019 at 3:11 PM                      Workstation performed: [id number]                    Procedures  Procedures       ED Course                               MDM  Number of Diagnoses or Management Options  Appendicitis, acute: new and requires workup     Amount and/or Complexity of Data Reviewed  Clinical lab tests: ordered and reviewed  Tests in the radiology section of CPT®: ordered and reviewed  Tests in the medicine section of CPT®: ordered and reviewed    Risk of Complications, Morbidity, and/or Mortality  Presenting problems: high  Diagnostic procedures: high  Management options: high  General comments: Patient presents emergency room for a 3 day history of abdominal pain  He was seen and examined  Laboratory studies were taken and reviewed  He is CT scan of his abdomen and pelvis demonstrated early appendicitis  Patient was given a dose of Ancef and Flagyl intravenously  A consult to surgery was placed  The patient will be admitted for an appendectomy      Patient Progress  Patient progress: stable      Disposition  Final diagnoses:   Appendicitis, acute     Time reflects when diagnosis was documented in both MDM as applicable and the Disposition within this note     Time User Action Codes Description Comment    7/3/2019  4:16 PM [place]illa Res Add [P73 90] Appendicitis, acute       ED Disposition     ED Disposition Condition Date/Time Comment    Admit Stable Wed Jul 3, 2019  3:36 PM Case was discussed with Narendra Avila, Surgical Resident and the patient's admission status was agreed to be Admission Status: observation status to the service of Dr Marla Chaves  Follow-up Information    None         Patient's Medications   Discharge Prescriptions    No medications on file     No discharge procedures on file      ED Provider  Electronically Signed by           Stephanie Prado PA-C  07/03/19 1853

## 2019-07-03 NOTE — INTERIM OP NOTE
APPENDECTOMY LAPAROSCOPIC  Postoperative Note  PATIENT NAME: Elma Rasheed    : 1990  MRN: [id number]  AN OR ROOM 03    Surgery Date: 7/3/2019    Preop Diagnosis:  Appendicitis, acute [K35 80]    Post-Op Diagnosis Codes:     * Appendicitis, acute [K35 80]    Procedure(s) (LRB):  APPENDECTOMY LAPAROSCOPIC (N/A)    Surgeon(s) and Role:     * Jaqui Mathur MD - Primary    Specimens:  ID Type Source Tests Collected by Time Destination   1 : CC: Cruz Morrell MD Tissue Appendix TISSUE Anson Holter, MD 7/3/2019 1732        Estimated Blood Loss:   Minimal    Anesthesia Type:   Choice     Findings:      Complications:   None    SIGNATURE: Jaqui Mathur MD   DATE: July 3, 2019   TIME: 6:25 PM

## 2019-07-03 NOTE — H&P
History and Physical -General Surgery  Tiffanie Marcano  34 y o  male MRN: [id number]  Unit/Bed#: ED 23 Encounter: [id number]        Reason for Consult / Principal Problem: RLQ abdominal pain  HPI: Tiffanie Marcano  is a 34y o  year old male with PMH of drug abuse on methadone who presents with acute appendicitis  Pt states he has been having dull RLQ pain for 2 days  Pain intensified last evening  He had night sweats and chills  Did have one episode of vomiting today  He has never had abdominal surgery  WBC 14  CT abd/pelvis revealed thickened appendix wall, most likely early appendicitis    Review of Systems   Constitutional: Positive for appetite change, chills and diaphoresis  Respiratory: Negative for shortness of breath and wheezing  Cardiovascular: Negative for chest pain and palpitations  Gastrointestinal: Positive for abdominal pain, nausea and vomiting  Negative for abdominal distention  Genitourinary: Negative for dysuria  Skin: Negative for rash and wound  Neurological: Negative for light-headedness, numbness and headaches  Hematological: Does not bruise/bleed easily  Psychiatric/Behavioral: Negative for behavioral problems, confusion and decreased concentration  Historical Information   Past Medical History:   Diagnosis Date    Bipolar 1 disorder ([facility] )     Depression     Opiate addiction ([facility] )     Psychiatric disorder      Past Surgical History:   Procedure Laterality Date    CLAVICLE FRACTURE REPAIR      closed treatment    ESOPHAGOGASTRODUODENOSCOPY N/A 8/17/2016    Procedure: ESOPHAGOGASTRODUODENOSCOPY (EGD); Surgeon: Jolene Mobley MD;  Location: [facility]; Service:      Social History   Social History     Substance and Sexual Activity   Alcohol Use No     Social History     Substance and Sexual Activity   Drug Use Yes    Types: Marijuana    Comment: last use of heroin Feb 6th 2016   Medical Marijuana     Social History     Tobacco Use   Smoking Status Current Every Day Smoker    Packs/day: 0 50   Smokeless Tobacco Never Used     Family History   Problem Relation Age of Onset    Depression Father     Anxiety disorder Father     Bipolar disorder Maternal Aunt        Meds/Allergies       (Not in a hospital admission)  Current Facility-Administered Medications   Medication Dose Route Frequency    ceFAZolin (ANCEF) IVPB (premix) 2,000 mg  2,000 mg Intravenous Once    metroNIDAZOLE (FLAGYL) IVPB (premix) 500 mg  500 mg Intravenous Once       Allergies   Allergen Reactions    Other      Pt does not want benzo, due to past hx of drug use  Objective     Blood pressure 119/57, pulse 71, temperature 98 1 °F (36 7 °C), temperature source Oral, resp  rate 18, weight 66 6 kg (146 lb 13 2 oz), SpO2 98 %    No intake or output data in the 24 hours ending 07/03/19 1604    PHYSICAL EXAM  General appearance: alert and oriented, in no acute distress  Skin: Skin color, texture, turgor normal  No rashes or lesions  Heart: regular rate and rhythm, S1, S2 normal, no murmur, click, rub or gallop  Lungs: clear to auscultation bilaterally  Abdomen: soft, bowel sounds +, tender to palpation RLQ  Neurological: aaox3, speech normal    Lab Results:   Admission on 07/03/2019   Component Date Value    WBC 07/03/2019 13 77*    RBC 07/03/2019 4 66     Hemoglobin 07/03/2019 14 2     Hematocrit 07/03/2019 41 5     MCV 07/03/2019 89     MCH 07/03/2019 30 5     MCHC 07/03/2019 34 2     RDW 07/03/2019 12 4     MPV 07/03/2019 9 6     Platelets 96/74/1381 353     nRBC 07/03/2019 0     Neutrophils Relative 07/03/2019 80*    Immat GRANS % 07/03/2019 0     Lymphocytes Relative 07/03/2019 15     Monocytes Relative 07/03/2019 5     Eosinophils Relative 07/03/2019 0     Basophils Relative 07/03/2019 0     Neutrophils Absolute 07/03/2019 10 84*    Immature Grans Absolute 07/03/2019 0 04     Lymphocytes Absolute 07/03/2019 2 08     Monocytes Absolute 07/03/2019 0 74     Eosinophils Absolute 07/03/2019 0 04     Basophils Absolute 07/03/2019 0 03     Sodium 07/03/2019 138     Potassium 07/03/2019 4 0     Chloride 07/03/2019 100     CO2 07/03/2019 29     ANION GAP 07/03/2019 9     BUN 07/03/2019 9     Creatinine 07/03/2019 0 95     Glucose 07/03/2019 94     Calcium 07/03/2019 8 9     AST 07/03/2019 13     ALT 07/03/2019 22     Alkaline Phosphatase 07/03/2019 59     Total Protein 07/03/2019 7 7     Albumin 07/03/2019 4 3     Total Bilirubin 07/03/2019 0 50     eGFR 07/03/2019 109     Lipase 07/03/2019 47*    Protime 07/03/2019 13 8     INR 07/03/2019 1 12     PTT 07/03/2019 31      Imaging Studies: I have personally reviewed pertinent reports  ASSESSMENT/PLAN:    34year old male presenting with acute appendicitis    - will plan for OR  Will discuss with attending on timing    - keep NPO for now  - IV ancef/flagyl  - tylenol for pain  Patient takes methadone daily  Counseling / Coordination of Care  Total time spent today  15 minutes  Greater than 50% of total time was spent with the patient and / or family counseling and / or coordination of care       Maine Colmenares PA-C

## 2019-07-03 NOTE — ANESTHESIA PREPROCEDURE EVALUATION
Review of Systems/Medical History  Patient summary reviewed  Chart reviewed  No history of anesthetic complications     Cardiovascular  Negative cardio ROS    Pulmonary  Smoker cigarette smoker  , Tobacco cessation counseling given ,        GI/Hepatic      Comment: Acute appendicitis      Negative  ROS        Endo/Other  Negative endo/other ROS      GYN       Hematology  Negative hematology ROS      Musculoskeletal  Negative musculoskeletal ROS        Neurology  Negative neurology ROS      Psychology   Depression , bipolar disorder,   Chronic opioid dependence   Comment: Past history of opioid abuse, on methadone 100mg/day         Physical Exam    Airway    Mallampati score: II  TM Distance: >3 FB  Neck ROM: full     Dental   No notable dental hx     Cardiovascular  Comment: Negative ROS, Rhythm: regular, Rate: normal, Cardiovascular exam normal    Pulmonary  Pulmonary exam normal Breath sounds clear to auscultation,     Other Findings        Anesthesia Plan  ASA Score- 2 Emergent    Anesthesia Type- general with ASA Monitors  Additional Monitors:   Airway Plan: ETT  Plan Factors-    Induction- intravenous  Postoperative Plan- Plan for postoperative opioid use  Informed Consent- Anesthetic plan and risks discussed with patient  I personally reviewed this patient with the CRNA  Discussed and agreed on the Anesthesia Plan with the CRNA  Migdalia Bai           Recent labs personally reviewed:  Lab Results   Component Value Date    WBC 13 77 (H) 07/03/2019    HGB 14 2 07/03/2019     07/03/2019     Lab Results   Component Value Date     11/17/2014    K 4 0 07/03/2019    BUN 9 07/03/2019    CREATININE 0 95 07/03/2019    GLUCOSE 104 11/17/2014     Lab Results   Component Value Date    PTT 31 07/03/2019      Lab Results   Component Value Date    INR 1 12 07/03/2019     Lab Results   Component Value Date    HGBA1C 5 4 10/27/2017       I, José Nunes MD, have personally seen and evaluated the patient prior to anesthetic care  I have reviewed the pre-anesthetic record, and other medical records if appropriate to the anesthetic care  If a CRNA is involved in the case, I have reviewed the CRNA assessment, if present, and agree  Risks/benefits and alternatives discussed with patient including possible PONV, sore throat, and possibility of rare anesthetic and surgical emergencies

## 2019-07-03 NOTE — LETTER
[address] [facility] MED SURG UN[facility]  [address]  [address]  [facility] [address]  Dept: [phone]    July 3, 2019     Patient: Betsy Castillo  YOB: 1990   Date of Visit: 7/3/2019       To Whom it May Concern:    Sharan Thomas is under my professional care  He was seen in the hospital 7/3/2019  Please excuse Ariana Grijalva from work duties from 7/3/19 to 7/13/19  If you have any questions or concerns, please don't hesitate to call           Sincerely,                Issac Leger PA-C

## 2019-07-03 NOTE — ANESTHESIA POSTPROCEDURE EVALUATION
Post-Op Assessment Note    CV Status:  Stable  Pain Score: 0    Pain management: adequate     Mental Status:  Alert and awake   Hydration Status:  Euvolemic   PONV Controlled:  Controlled   Airway Patency:  Patent   Post Op Vitals Reviewed: Yes      Staff: CRNA           /73 (07/03/19 1826)    Temp 98 3 °F (36 8 °C) (07/03/19 1826)    Pulse 96 (07/03/19 1826)   Resp 16 (07/03/19 1826)    SpO2 100 % (07/03/19 1826)

## 2019-07-03 NOTE — DISCHARGE INSTRUCTIONS
Please follow-up as scheduled      Activity:  - No lifting greater than 20 pounds or strenuous physical activity or exercise for 2 weeks  - Walking and normal light activities are encouraged  - Normal daily activities including climbing steps are okay  - No driving until no longer using pain medications      Diet:    - You may resume your normal diet      Wound Care:  - May shower daily  No tub baths or swimming until cleared by your surgeon   - Wash incision gently with soap and water and pat dry  - Do not apply any creams or ointments unless instructed to do so by your surgeon   - Bobbette Hamper is not unusual and will go away with a little time  You may apply a warm, moist compress that may help the bruising resolve quicker        Medications:    - Take tylenol for pain     - You may become constipated, especially if taking pain medications  You may take any over the counter stool softeners or laxatives as needed  Examples: Milk of Magnesia, Colace, Senna  Additional Instructions:  - If you have any questions or concerns after discharge please call the office   - Call office or return to ER if fever greater than 101, chills, persistent nausea/vomiting, worsening/uncontrollable pain, and/or increasing redness or purulent/foul smelling drainage from incision(s)

## 2019-07-04 NOTE — OP NOTE
OPERATIVE REPORT  PATIENT NAME: Tasneem Sandhu  :  1990  MRN: [id number]  Pt Location: AN OR ROOM 03    SURGERY DATE: 7/3/2019    Surgeon(s) and Role:     * Daniel Shepherd MD - Primary    Preop Diagnosis:  Appendicitis, acute [K35 80]    Post-Op Diagnosis Codes:     * Appendicitis, acute [K35 80]    Procedure(s) (LRB):  APPENDECTOMY LAPAROSCOPIC (N/A)    Specimen(s):  ID Type Source Tests Collected by Time Destination   1 : CC: Manoj Mendez MD Tissue Appendix TISSUE Tarah Lou MD 7/3/2019 1732        Estimated Blood Loss:   Minimal    Drains:  [REMOVED] NG/OG/Enteral Tube Orogastric 18 Fr Right mouth (Removed)   Number of days: 0       Anesthesia Type:   Choice    Operative Indications:  Appendicitis, acute [K35 80]      Operative Findings  Independent, smoker             ASA 2, wound class 2, BMI 21, weight 146, height 70  Cardiovascular  Negative cardio ROS     Pulmonary  Smoker cigarette smoker  , Tobacco cessation counseling given ,          GI/Hepatic        Comment: Acute appendicitis       Negative  ROS          Endo/Other  Negative endo/other ROS        GYN         Hematology  Negative hematology ROS        Musculoskeletal  Negative musculoskeletal ROS          Neurology  Negative neurology ROS        Psychology   Depression , bipolar disorder,   Chronic opioid dependence   Comment: Past history of opioid abuse, on methadone [id number]           Complications:   None    Procedure and Technique:  Patient was identified visually and via armband  Placed in supine position  After general anesthesia the abdomen was prepped and draped in a sterile fashion  0 25% Marcaine with epinephrine was utilized throughout the procedure  Incision was made at the umbilicus  This carried down through skin subcutaneous tissue  A direct vision a 12 mm trocar was inserted  This was followed by CO2 insufflation with a back pressure 15  Two additional 5 mm ports were then placed    Laparoscopic visualization revealed acute suppurative appendicitis  The mesoappendix was divided using Harmonic scalpel  This carried down level of the appendix  This was then divided using an Endo-DION stapling device  The appendix was placed in Endo-Catch bag and removed through the umbilicus  Fascia was closed with 0 Vicryl suture followed by 4 Monocryl suture and Dermabond  Patient tolerated this procedure well  Sponge instrument count correct x2       I was present for the entire procedure    Patient Disposition:  PACU     SIGNATURE: Porfirio Nugent MD  DATE: July 4, 2019  TIME: 6:17 PM

## 2019-07-05 ENCOUNTER — TRANSITIONAL CARE MANAGEMENT (OUTPATIENT)
Dept: FAMILY MEDICINE CLINIC | Facility: CLINIC | Age: 29
End: 2019-07-05

## 2019-07-21 PROBLEM — K35.80 APPENDICITIS, ACUTE: Status: RESOLVED | Noted: 2019-07-03 | Resolved: 2019-07-21

## 2019-08-07 ENCOUNTER — TRANSITIONAL CARE MANAGEMENT (OUTPATIENT)
Dept: FAMILY MEDICINE CLINIC | Facility: CLINIC | Age: 29
End: 2019-08-07

## 2019-08-09 ENCOUNTER — OFFICE VISIT (OUTPATIENT)
Dept: FAMILY MEDICINE CLINIC | Facility: CLINIC | Age: 29
End: 2019-08-09
Payer: COMMERCIAL

## 2019-08-09 VITALS
RESPIRATION RATE: 16 BRPM | SYSTOLIC BLOOD PRESSURE: 118 MMHG | DIASTOLIC BLOOD PRESSURE: 78 MMHG | OXYGEN SATURATION: 98 % | WEIGHT: 143.38 LBS | HEART RATE: 96 BPM | HEIGHT: 70 IN | BODY MASS INDEX: 20.53 KG/M2 | TEMPERATURE: 97.9 F

## 2019-08-09 DIAGNOSIS — F41.1 GAD (GENERALIZED ANXIETY DISORDER): ICD-10-CM

## 2019-08-09 DIAGNOSIS — K59.00 CONSTIPATION, UNSPECIFIED CONSTIPATION TYPE: ICD-10-CM

## 2019-08-09 DIAGNOSIS — F52.4 PREMATURE EJACULATION: ICD-10-CM

## 2019-08-09 DIAGNOSIS — F11.20 METHADONE MAINTENANCE THERAPY PATIENT (HCC): ICD-10-CM

## 2019-08-09 DIAGNOSIS — Z79.899 MEDICAL MARIJUANA USE: ICD-10-CM

## 2019-08-09 DIAGNOSIS — R55 POSTURAL DIZZINESS WITH NEAR SYNCOPE: ICD-10-CM

## 2019-08-09 DIAGNOSIS — E55.9 VITAMIN D DEFICIENCY: ICD-10-CM

## 2019-08-09 DIAGNOSIS — R55 NEAR SYNCOPE: Primary | ICD-10-CM

## 2019-08-09 DIAGNOSIS — R42 POSTURAL DIZZINESS WITH NEAR SYNCOPE: ICD-10-CM

## 2019-08-09 PROCEDURE — 99496 TRANSJ CARE MGMT HIGH F2F 7D: CPT | Performed by: FAMILY MEDICINE

## 2019-08-13 PROBLEM — F41.1 GAD (GENERALIZED ANXIETY DISORDER): Status: ACTIVE | Noted: 2019-08-13

## 2019-08-13 PROBLEM — R55 NEAR SYNCOPE: Status: ACTIVE | Noted: 2019-08-13

## 2019-08-13 PROBLEM — Z79.899 MEDICAL MARIJUANA USE: Status: ACTIVE | Noted: 2019-08-13

## 2019-08-13 PROBLEM — F11.20 METHADONE MAINTENANCE THERAPY PATIENT (HCC): Status: ACTIVE | Noted: 2019-08-13

## 2019-08-13 PROBLEM — F52.4 PREMATURE EJACULATION: Status: ACTIVE | Noted: 2019-08-13

## 2019-08-14 ENCOUNTER — HOSPITAL ENCOUNTER (OUTPATIENT)
Dept: NON INVASIVE DIAGNOSTICS | Facility: HOSPITAL | Age: 29
Discharge: HOME | End: 2019-08-14
Payer: COMMERCIAL

## 2019-08-14 DIAGNOSIS — R42 POSTURAL DIZZINESS WITH NEAR SYNCOPE: ICD-10-CM

## 2019-08-14 DIAGNOSIS — R55 POSTURAL DIZZINESS WITH NEAR SYNCOPE: ICD-10-CM

## 2019-08-14 DIAGNOSIS — R55 NEAR SYNCOPE: ICD-10-CM

## 2019-08-14 PROCEDURE — 93226 XTRNL ECG REC<48 HR SCAN A/R: CPT

## 2019-08-14 PROCEDURE — 93225 XTRNL ECG REC<48 HRS REC: CPT

## 2019-08-17 ENCOUNTER — HOSPITAL ENCOUNTER (OUTPATIENT)
Dept: CT IMAGING | Facility: HOSPITAL | Age: 29
Discharge: HOME | End: 2019-08-17
Payer: COMMERCIAL

## 2019-08-17 DIAGNOSIS — R55 POSTURAL DIZZINESS WITH NEAR SYNCOPE: ICD-10-CM

## 2019-08-17 DIAGNOSIS — R42 POSTURAL DIZZINESS WITH NEAR SYNCOPE: ICD-10-CM

## 2019-08-17 DIAGNOSIS — R55 NEAR SYNCOPE: ICD-10-CM

## 2019-08-17 PROCEDURE — 70450 CT HEAD/BRAIN W/O DYE: CPT

## 2019-08-19 ENCOUNTER — TELEPHONE (OUTPATIENT)
Dept: FAMILY MEDICINE CLINIC | Facility: CLINIC | Age: 29
End: 2019-08-19

## 2019-08-19 PROCEDURE — 93227 XTRNL ECG REC<48 HR R&I: CPT | Performed by: INTERNAL MEDICINE

## 2022-07-11 ENCOUNTER — HOSPITAL ENCOUNTER (EMERGENCY)
Facility: HOSPITAL | Age: 32
Discharge: HOME | End: 2022-07-11
Attending: EMERGENCY MEDICINE
Payer: COMMERCIAL

## 2022-07-11 VITALS
TEMPERATURE: 98.1 F | HEART RATE: 66 BPM | WEIGHT: 150 LBS | RESPIRATION RATE: 18 BRPM | OXYGEN SATURATION: 100 % | HEIGHT: 70 IN | SYSTOLIC BLOOD PRESSURE: 151 MMHG | DIASTOLIC BLOOD PRESSURE: 84 MMHG | BODY MASS INDEX: 21.47 KG/M2

## 2022-07-11 DIAGNOSIS — J06.9 URI (UPPER RESPIRATORY INFECTION): Primary | ICD-10-CM

## 2022-07-11 LAB
FLUAV RNA RESP QL NAA+PROBE: NEGATIVE
FLUBV RNA RESP QL NAA+PROBE: NEGATIVE
RSV RNA RESP QL NAA+PROBE: NEGATIVE
SARS-COV-2 RNA RESP QL NAA+PROBE: POSITIVE

## 2022-07-11 PROCEDURE — 0241U HB NFCT DS VIR RESP RNA 4 TRGT: CPT

## 2022-07-11 PROCEDURE — 99283 EMERGENCY DEPT VISIT LOW MDM: CPT

## 2022-07-11 PROCEDURE — 99282 EMERGENCY DEPT VISIT SF MDM: CPT | Performed by: EMERGENCY MEDICINE

## 2022-07-11 RX ORDER — FLUTICASONE PROPIONATE 50 MCG
1 SPRAY, SUSPENSION (ML) NASAL DAILY
Qty: 16 G | Refills: 0 | Status: SHIPPED | OUTPATIENT
Start: 2022-07-11

## 2022-07-11 NOTE — RESULT ENCOUNTER NOTE
Patient aware of positive covid test, no further questions  Discussed Isolation and treatment  Recommend follow up with PCP

## 2022-07-11 NOTE — ED PROVIDER NOTES
History  Chief Complaint   Patient presents with    Medical Problem     Patient reports having earache since Monday  States he woke up this AM with loss of taste and smell  Also reporting chest congesting and loss of appetite  Denies fever +Nausea - vomiting     Patient is a 29-year-old male with history of bipolar disorder who presents with loss of taste and smell  Patient states that he began having a left earache about 4-5 days ago  He describes intermittent popping in his left ear  He also describes an intermittent cough and myalgias  He has had intermittent nausea but no vomiting  This morning, he woke up with loss of taste and smell  He has not been vaccinated for COVID  He states that [facility] [place] at work had COVID weeks ago  No known sick contacts  He denies any chest pain, shortness of breath, fever, chills or other concerns  History provided by:  Patient  URI  Presenting symptoms: congestion and cough    Presenting symptoms: no fever and no sore throat    Severity:  Mild  Chronicity:  New  Ineffective treatments:  None tried  Associated symptoms: myalgias    Associated symptoms: no headaches and no neck pain        Prior to Admission Medications   Prescriptions Last Dose Informant Patient Reported? Taking? EPINEPHrine (EPIPEN 2-RUFINA) 0 3 mg/0 3 mL SOAJ   Yes No   Sig: Inject as directed   methadone (DOLOPHINE) 10 mg/mL oral concentrated solution   Yes No   Sig: Take 80 mg by mouth       Facility-Administered Medications: None       Past Medical History:   Diagnosis Date    Bipolar 1 disorder ([facility] )     Depression     Opiate addiction ([place] [address] )     Psychiatric disorder        Past Surgical History:   Procedure Laterality Date    CLAVICLE FRACTURE REPAIR      closed treatment    ESOPHAGOGASTRODUODENOSCOPY N/A 8/17/2016    Procedure: ESOPHAGOGASTRODUODENOSCOPY (EGD); Surgeon: Otto Rios MD;  Location: [facility];   Service:     LA LAP,APPENDECTOMY N/A 7/3/2019    Procedure: APPENDECTOMY LAPAROSCOPIC;  Surgeon: Silvina Busch MD;  Location: [facility];  Service: General       Family History   Problem Relation Age of Onset    Depression Father     Anxiety disorder Father     Bipolar disorder Maternal Aunt      I have reviewed and agree with the history as documented  E-Cigarette/Vaping     E-Cigarette/Vaping Substances     Social History     Tobacco Use    Smoking status: Current Every Day Smoker     Packs/day: 0 50    Smokeless tobacco: Never Used   Substance Use Topics    Alcohol use: No    Drug use: Yes     Types: Marijuana     Comment: last use of heroin Feb 6th 2016  Medical Marijuana       Review of Systems   Constitutional: Negative for chills, diaphoresis and fever  HENT: Positive for congestion  Negative for nosebleeds, sore throat and trouble swallowing  Eyes: Negative for photophobia, pain and visual disturbance  Respiratory: Positive for cough  Negative for chest tightness and shortness of breath  Cardiovascular: Negative for chest pain, palpitations and leg swelling  Gastrointestinal: Negative for abdominal pain, constipation, diarrhea, nausea and vomiting  Endocrine: Negative for polydipsia and polyuria  Genitourinary: Negative for difficulty urinating, dysuria and hematuria  Musculoskeletal: Positive for myalgias  Negative for back pain, neck pain and neck stiffness  Skin: Negative for pallor and rash  Neurological: Negative for dizziness, syncope, light-headedness and headaches  All other systems reviewed and are negative  Physical Exam  Physical Exam  Vitals and nursing note reviewed  Constitutional:       General: He is not in acute distress  Appearance: He is well-developed  HENT:      Head: Normocephalic and atraumatic  Eyes:      Pupils: Pupils are equal, round, and reactive to light  Cardiovascular:      Rate and Rhythm: Normal rate and regular rhythm  Pulses: Normal pulses  Heart sounds: Normal heart sounds  Pulmonary:      Effort: Pulmonary effort is normal  No respiratory distress  Breath sounds: Normal breath sounds  Abdominal:      General: There is no distension  Palpations: Abdomen is soft  Abdomen is not rigid  Tenderness: There is no abdominal tenderness  There is no guarding or rebound  Musculoskeletal:         General: No tenderness  Normal range of motion  Cervical back: Normal range of motion and neck supple  Lymphadenopathy:      Cervical: No cervical adenopathy  Skin:     General: Skin is warm and dry  Capillary Refill: Capillary refill takes less than 2 seconds  Neurological:      Mental Status: He is alert and oriented to person, place, and time  Cranial Nerves: No cranial nerve deficit  Sensory: No sensory deficit  Vital Signs  ED Triage Vitals [07/11/22 0649]   Temperature Pulse Respirations Blood Pressure SpO2   98 1 °F (36 7 °C) 66 18 151/84 100 %      Temp Source Heart Rate Source Patient Position - Orthostatic VS BP Location FiO2 (%)   Oral Monitor Sitting Right arm --      Pain Score       --           Vitals:    07/11/22 0649   BP: 151/84   Pulse: 66   Patient Position - Orthostatic VS: Sitting         Visual Acuity      ED Medications  Medications - No data to display    Diagnostic Studies  Results Reviewed     Procedure Component Value Units Date/Time    FLU/RSV/COVID - if FLU/RSV clinically relevant [[id number]] Collected: 07/11/22 0656    Lab Status: In process Specimen: Nares from Nose Updated: 07/11/22 0701                 No orders to display              Procedures  Procedures         ED Course                                             MDM  Number of Diagnoses or Management Options  URI (upper respiratory infection): new and does not require workup  Diagnosis management comments: Patient presents with congestion, earache, cough, myalgias  This morning, he developed loss of taste and smell concerning for COVID infection    Patient is hemodynamically stable  He has no evidence of respiratory distress  Lung exam is unremarkable  Do not feel that imaging is required at this time  Patient was tested for COVID-19 and we will call him with results  Until then, he should stay home from work and quarantine pending results of testing  He is well-appearing and stable for discharge  Portions of the above record have been created with voice recognition software   Occasional wrong word or "sound alike" substitutions may have occurred due to the inherent limitations of voice recognition software   Read the chart carefully and recognize, using context, where substitutions may have occurred  Amount and/or Complexity of Data Reviewed  Clinical lab tests: ordered  Review and summarize past medical records: yes    Risk of Complications, Morbidity, and/or Mortality  Presenting problems: moderate  Diagnostic procedures: low  Management options: low    Patient Progress  Patient progress: stable      Disposition  Final diagnoses:   URI (upper respiratory infection)     Time reflects when diagnosis was documented in both MDM as applicable and the Disposition within this note     Time User Action Codes Description Comment    7/11/2022  7:01 AM Chris MCLAIN Add [J06 9] URI (upper respiratory infection)       ED Disposition     ED Disposition   Discharge    Condition   Stable    Date/Time   Mon Jul 11, 2022  7:01 AM    Rashmi Avila  discharge to home/self care                 Follow-up Information     Follow up With Specialties Details Why Azra Osuna MD Family Medicine Schedule an appointment as soon as possible for a visit  Return to ED sooner if symptoms worsen or persist  [address] [place] [address]  [phone]            Patient's Medications   Discharge Prescriptions    FLUTICASONE (FLONASE) 50 MCG/ACT NASAL SPRAY    1 spray into each nostril daily       Start Date: 7/11/2022 End Date: -- Order Dose: 1 spray       Quantity: 16 g    Refills: 0       No discharge procedures on file      PDMP Review     None          ED Provider  Electronically Signed by           Hugo Small DO  07/11/22 7814

## 2023-09-12 NOTE — PROGRESS NOTES
Problem: Adult Inpatient Plan of Care  Goal: Plan of Care Review  Outcome: Progressing  Flowsheets (Taken 9/12/2023 0632)  Progress: no change  Outcome Evaluation:   Pt AAOx4. VSS. Pt denies pain. Underlying Afib with some pacing on tele   pt asymptomatic. CPAP on overnight. Continue IVF's. Continue IV Unasyn. Urine cultures pending. Bed alarm on and call bell within reach.  Plan of Care Reviewed With: patient   Plan of Care Review  Plan of Care Reviewed With: patient  Progress: no change  Outcome Evaluation: Pt AAOx4. VSS. Pt denies pain. Underlying Afib with some pacing on tele; pt asymptomatic. CPAP on overnight. Continue IVF's. Continue IV Unasyn. Urine cultures pending. Bed alarm on and call bell within reach.   Assessment    1  Fatigue (780 79) (R53 83)   2  Adjustment disorder with depressed mood (309 0) (F43 21)   3  Disc degeneration, lumbar (722 52) (M51 36)   4  Encounter for preventive health examination (V70 0) (Z00 00)    Plan  Adjustment disorder with depressed mood    · BuPROPion HCl ER (XL) 150 MG Oral Tablet Extended Release 24 Hour  (Wellbutrin XL); take 1 tablet by mouth every day   · From  Sertraline HCl - 50 MG Oral Tablet TAKE 1/2 TABLET BY MOUTH FOR 6  DAYS THEN 1 TABLET BY MOUTH EVERY DAY; start on 4/14/16 To Sertraline HCl - 50  MG Oral Tablet (Zoloft) Take 1/2 tablet daily for 10 days then stop  Avitaminosis D    · (1) C-REACTIVE PROTEIN; Status:Resulted - Requires Verification;   Done: 61PQC8096  08:55AM   · (1) LYME ANTIBODY PROFILE W/REFLEX TO WESTERN BLOT; Status:Active;   Requested for:30Apr2016;    · (1) SED RATE; Status:Resulted - Requires Verification;   Done: 30Apr2016 08:55AM   · (1) VITAMIN B12; Status:Resulted - Requires Verification;   Done: 86BVK3256 08:55AM  Avitaminosis D, Fatigue    · (1) VITAMIN D 25-HYDROXY; Status:Resulted - Requires Verification;   Done:  62DPI8649 08:55AM  Disc degeneration, lumbar    · (1) URINALYSIS (will reflex a microscopy if leukocytes, occult blood, protein or nitrites  are not within normal limits); Status:Resulted - Requires Verification;   Done: 52Yqb0349  08:55AM   · * XR SPINE LUMBAR MINIMUM 4 VIEWS; Status:Resulted - Requires Verification;    Done: 11DLE1811 01:07PM  Disc degeneration, lumbar, Fatigue    · (1) URINE CULTURE; Source:Urine, Unspecified Source; Status:Resulted - Requires  Verification;   Done: 42OJB8738 08:55AM  Fatigue    · (1) ACUTE HEPATITIS PANEL; Status:Resulted - Requires Verification;   Done:  54JLJ5901 08:55AM   · (1) CBC/PLT/DIFF; Status:Resulted - Requires Verification;   Done: 99LRI3795 08:55AM   · (1) COMPREHENSIVE METABOLIC PANEL; Status:Resulted - Requires Verification;    Done: 40TVC2204 08:55AM   · (1) HIV AG/AB COMBO, 4TH GEN; [Do Not Release]; Status:Active; Requested  for:30Apr2016;    · (1) TSH; Status:Resulted - Requires Verification;   Done: 77TTU6862 08:55AM  Insomnia    · TraZODone HCl - 100 MG Oral Tablet; TAKE ONE (1) TABLET(S) DAILY AT  [facility] NEEDED    Discussion/Summary  Impression: health maintenance visit  Currently, he eats a healthy diet  Annual well exam/ follow-up  Improved symptoms of acid reflux and seasonal allergies  Patient complains of ongoing fatigue, decreased concentration ability, chronic low back pain  Symptoms of insomnia have improved, still on and off symptoms of depression  We will decrease dose of Zoloft to 25 mg once a day for 10 days and will discontinue medication afterwards  Patient will start Wellbutrin  mg once a day  We will proceed with blood work as outlined above  We will check x-ray of lumbar sacral spine  Follow-up 4- 5 weeks  Possible side effects of new medications were reviewed with the patient/guardian today  The was counseled regarding diagnostic results, instructions for management, risk factor reductions, impressions  Chief Complaint  Patient is here for a yearly wellness exam  Patient c/o an ongoing issue with fatigue which is not improving  Patient would lime a medication refill for Trazodone  All medications were reviewed and updated with the patient  Fatigued  Difficulty staying focused, no motivation  Worse in am   Low back pain - heavy lifting - Motrin helps  Ongoing problem with concentration - since high school   Tried energy drinks -they did not help  Sleeping is better   Chronic low back pain  Abdominal pain has improved  No dysuria or flank pain, no hematuria               History of Present Illness  HPI: h/o opiod addcition  Over 30 days of sobriety / no opioids  Used 15 mg of oxycodone po BID  legs feel tired after standing for a long time       Review of Systems    Constitutional: feeling tired     Eyes: No complaints of eye pain, no red eyes, no discharge from eyes, no itchy eyes  ENT: no complaints of earache, no hearing loss, no nosebleeds, no nasal discharge, no sore throat, no hoarseness  Cardiovascular: No complaints of slow heart rate, no fast heart rate, no chest pain, no palpitations, no leg claudication, no lower extremity  Respiratory: No complaints of shortness of breath, no wheezing, no cough, no SOB on exertion, no orthopnea or PND  Gastrointestinal: No complaints of abdominal pain, no constipation, no nausea or vomiting, no diarrhea or bloody stools  Genitourinary: No complaints of dysuria, no incontinence, no hesitancy, no nocturia, no genital lesion, no testicular pain  Musculoskeletal: lower back pain  Integumentary: No complaints of skin rash or skin lesions, no itching, no skin wound, no dry skin  Neurological: decreased concentrating ability  Psychiatric: anxiety, depression and emotional problems, but as noted in HPI  Endocrine: No complaints of proptosis, no hot flashes, no muscle weakness, no erectile dysfunction, no deepening of the voice, no feelings of weakness  Hematologic/Lymphatic: No complaints of swollen glands, no swollen glands in the neck, does not bleed easily, no easy bruising  Active Problems    1  Abdominal pain (789 00) (R10 9)   2  Allergic rhinitis (477 9) (J30 9)   3  Anxiety (300 00) (F41 9)   4  Axillary lymphadenopathy (785 6) (R59 0)   5  Chills (780 64) (R68 83)   6  Constipation (564 00) (K59 00)   7  Decreased appetite (783 0) (R63 0)   8  Disc degeneration, lumbar (722 52) (M51 36)   9  Enteritis, infectious (009 0) (A09)   10  Fibromyalgia (729 1) (M79 7)   11  Insomnia (780 52) (G47 00)   12   Sore throat (462) (J02 9)    Past Medical History    · No pertinent past medical history   · History of Opioid dependence (304 00) (F11 20)    Family History  Mother    · No pertinent family history  Father    · Family history of Depression with anxiety    Social History    · Current every day smoker (305 1) (F17 200)    Current Meds   1  Desloratadine 5 MG Oral Tablet; TAKE 1 TABLET BY MOUTH DAILY; Therapy: 11Apr2016 to (Evaluate:08Oct2016)  Requested for: 11Apr2016; Last   Rx:11Apr2016 Ordered   2  EpiPen 2-Eleuterio 0 3 MG/0 3ML Injection Solution Auto-injector; Therapy: 82ZFX8450 to (Evaluate:17Nov2014) Recorded   3  Pantoprazole Sodium 40 MG Oral Tablet Delayed Release; Take 1 tablet daily; Therapy: 11Apr2016 to (Last Rx:11Apr2016)  Requested for: 11Apr2016 Ordered   4  Sertraline HCl - 50 MG Oral Tablet; TAKE 1/2 TABLET BY MOUTH FOR 6 DAYS THEN 1   TABLET BY MOUTH EVERY DAY; start on 4/14/16; Therapy: 11Apr2016 to (Last Rx:11Apr2016)  Requested for: 11Apr2016 Ordered   5  TraZODone HCl - 100 MG Oral Tablet; TAKE ONE (1) TABLET(S) DAILY AT BEDTIMEAS   NEEDED; Therapy: 11Apr2016 to (Evaluate:13Wmb4063)  Requested for: 11Apr2016; Last   Rx:11Apr2016 Ordered    Allergies    1  No Known Drug Allergies    Vitals   Recorded: 30Apr2016 08:01AM   Temperature 96 7 F   Heart Rate 76   Respiration 16   Systolic 371   Diastolic 92   Height 5 ft 9 in   Weight 165 lb    BMI Calculated 24 37   BSA Calculated 1 9     Physical Exam    Constitutional   General appearance: No acute distress, well appearing and well nourished  118/80 recheck blood pressure  Head and Face   Head and face: Normal     Eyes   Conjunctiva and lids: No erythema, swelling or discharge  Pupils and irises: Equal, round, reactive to light  Pulmonary   Respiratory effort: No increased work of breathing or signs of respiratory distress  Auscultation of lungs: Clear to auscultation  Cardiovascular   Auscultation of heart: Normal rate and rhythm, normal S1 and S2, no murmurs  Carotid pulses: 2+ bilaterally  Abdominal aorta: Normal     Examination of extremities for edema and/or varicosities: Normal     Abdomen   Abdomen: Abnormal   The abdomen was flat   Bowel sounds were normal  There was mild tenderness in the right lower quadrant and in the left lower quadrant  The abdomen was not firm and not rigid  No rebound tenderness  No guarding  no CVA tenderness   Liver and spleen: No hepatomegaly or splenomegaly  Musculoskeletal   Gait and station: Normal     Inspection/palpation of joints, bones, and muscles: Normal   No tenderness with palpation of lumbar sacral spine, paraspinal spasm, positive straight leg raising bilaterally at 60-70 degrees   Neurologic   Cranial nerves: Cranial nerves 2-12 intact  Psychiatric   Judgment and insight: Normal     Orientation to person, place and time: Normal     Recent and remote memory: Intact  Mood and affect: Normal        Results/Data  * XR SPINE LUMBAR MINIMUM 4 VIEWS [id number] 01:07PM Lugene Dance Order Number: [id number]     Test Name Result Flag Reference   XR SPINE LUMBAR MINIMUM 4 VIEWS (Report)     LUMBAR SPINE     INDICATION: LUMBAR SPINE     INDICATION: Lower back pain  COMPARISON: None     VIEWS: AP, lateral and bilateral oblique projections; 4 images     FINDINGS:     Minor scoliotic deformity is noted  Alignment is otherwise unremarkable  There is no radiographic evidence of acute fracture or destructive osseous lesion  No significant lumbar degenerative change noted  Visualized soft tissues appear unremarkable  IMPRESSION:     Normal examination            Workstation performed: [id number]     Signed by:   Francesca Mendoza MD   5/1/16     (1) CBC/PLT/DIFF 91QEB6016 08:55AM Lugene Dance Order Number: [id number]     Order Number: [id number]     Test Name Result Flag Reference   WBC COUNT 7 93 Thousand/uL  4 31-10 16   RBC COUNT 4 96 Million/uL  3 88-5 62   HEMOGLOBIN 15 3 g/dL  12 0-17 0   HEMATOCRIT 45 6 %  36 5-49 3   MCV 92 fL  82-98   MCH 30 8 pg  26 8-34 3   MCHC 33 6 g/dL  31 4-37 4   RDW 13 7 %  11 6-15 1   MPV 10 7 fL  8 9-12 7   PLATELET COUNT 268 Thousands/uL H 149-390   nRBC AUTOMATED 0 /100 WBCs NEUTROPHILS RELATIVE PERCENT 66 %  43-75   LYMPHOCYTES RELATIVE PERCENT 26 %  14-44   MONOCYTES RELATIVE PERCENT 5 %  4-12   EOSINOPHILS RELATIVE PERCENT 3 %  0-6   BASOPHILS RELATIVE PERCENT 0 %  0-1   NEUTROPHILS ABSOLUTE COUNT 5 21 Thousands/µL  1 85-7 62   LYMPHOCYTES ABSOLUTE COUNT 2 08 Thousands/µL  0 60-4 47   MONOCYTES ABSOLUTE COUNT 0 39 Thousand/µL  0 17-1 22   EOSINOPHILS ABSOLUTE COUNT 0 22 Thousand/µL  0 00-0 61   BASOPHILS ABSOLUTE COUNT 0 02 Thousands/µL  0 00-0 10     (1) COMPREHENSIVE METABOLIC PANEL 71ONA4745 53:44WH Ruth Annaugustus Davisg Order Number: [id number]    TW Order Number: [id number] Order Number: [id number] Order Number: [id number] Order Number: [id number]  National Kidney Disease Education Program recommendations are as follows:  GFR calculation is accurate only with a steady state creatinine  Chronic Kidney disease less than 60 ml/min/1 73 sq  meters  Kidney failure less than 15 ml/min/1 73 sq  meters  Test Name Result Flag Reference   GLUCOSE,RANDM 86 mg/dL     If the patient is fasting, the ADA then defines impaired fasting glucose as > 100 mg/dL and diabetes as > or equal to 123 mg/dL     SODIUM 137 mmol/L  136-145   POTASSIUM 3 9 mmol/L  3 5-5 3   CHLORIDE 105 mmol/L  100-108   CARBON DIOXIDE 29 mmol/L  21-32   ANION GAP (CALC) 3 mmol/L L 4-13   BLOOD UREA NITROGEN 15 mg/dL  5-25   CREATININE 0 91 mg/dL  0 60-1 30   Standardized to IDMS reference method   CALCIUM 8 6 mg/dL  8 3-10 1   BILI, TOTAL 0 30 mg/dL  0 20-1 00   ALK PHOSPHATAS 62 U/L     ALT (SGPT) 25 U/L  12-78   AST(SGOT) 9 U/L  5-45   ALBUMIN 4 5 g/dL  3 5-5 0   TOTAL PROTEIN 8 2 g/dL  6 4-8 2   eGFR Non-African American      >60 0 ml/min/1 73sq m     (1) ACUTE HEPATITIS PANEL 30Apr2016 08:55AM Maryam Hair   TW Order Number: [id number]    TW Order Number: [id number]     Test Name Result Flag Reference   HEPATITIS B SURFACE ANTIGEN Non-reactive  Non-reactive, NonReactive - Confirmed   HEPATITIS A IGM ANTIBODY Non-reactive  Non-reactive, Equivocal-Suggest Recollect   HEPATITIS C ANTIBODY Non-reactive  Non-reactive   HEPATITIS B CORE IGM ANTIBODY Non-reactive  Non-reactive     (1) TSH 30Apr2016 08:55AM Summon    Order Number: [id number]     Order Number: [id number] Order Number: [id number] Order Number: [id number] Order Number: [id number]     Test Name Result Flag Reference   TSH 1 140 uIU/mL  0 358-3 740     (1) VITAMIN D 25-HYDROXY 30Apr2016 08:55AM Mirza Drop Order Number: [id number]    TW Order Number: [id number]     Test Name Result Flag Reference   VIT D 25-HYDROX 9 6 ng/mL L 30 0-100 0     (1) VITAMIN B12 30Apr2016 08:55AM Summon    Order Number: [id number]    TW Order Number: [id number] Order Number: [id number] Order Number: [id number] Order Number: [id number]     Test Name Result Flag Reference   VITAMIN B12 301 pg/mL  100-900     (1) SED RATE 30Apr2016 08:55AM Mirza Drop Order Number: [id number]     Test Name Result Flag Reference   SED RATE 2 mm/hour  0-10     (1) C-REACTIVE PROTEIN 30Apr2016 08:55AM Summon    Order Number: [id number]    TW Order Number: [id number] Order Number: [id number] Order Number: [id number] Order Number: [id number]     Test Name Result Flag Reference   C-REACT PROTEIN <3 0 mg/L  <3 0     (1) URINALYSIS (will reflex a microscopy if leukocytes, occult blood, protein or nitrites are not within normal limits) 30Apr2016 08:55AM Mirza Drop Order Number: [id number]     Test Name Result Flag Reference   COLOR Yellow     CLARITY Clear     SPECIFIC GRAVITY UA 1 023  1 003-1 030   PH UA 7 5  4 5-8 0   LEUKOCYTE ESTERASE UA Negative  Negative   NITRITE UA Negative  Negative   PROTEIN UA Trace mg/dl A Negative   GLUCOSE UA Negative mg/dl  Negative   KETONES UA Negative mg/dl  Negative   UROBILINOGEN UA 0 2 E U /dl  0 2, 1 0 E U /dl BILIRUBIN UA Negative  Negative   BLOOD UA Negative  Negative   BACTERIA None Seen /hpf  None Seen, Occasional   EPITHELIAL CELLS Occasional /hpf  None Seen, Occasional   OTHER OBSERVATIONS Sperm Present     RBC UA None Seen /hpf  None Seen   WBC UA None Seen /hpf  None Seen     (1) URINE CULTURE 30Apr2016 08:55AM Jaquelin Malave   TW Order Number: [id number]     Test Name Result Flag Reference   CLINICAL REPORT (Report)     Test:        Urine culture  Specimen Type:   Urine  Specimen Date:   4/30/2016 8:55 AM  Result Date:    5/1/2016 12:25 PM  Result Status:   Final result  Resulting Lab:   [facility] [address] [place] [address]            Tel: [phone]                 CULTURE                                       ------------------                                   No Growth <1000 cfu/mL       Future Appointments    Date/Time Provider Specialty Site   05/31/2016 04:30 PM HERMES Wayne  Family Medicine [address] [place] [address]     Signatures   Electronically signed by :  HERMES Arnett ; May  2 2016 10:56AM EST                       (Author)

## 2023-12-13 ENCOUNTER — TELEPHONE (OUTPATIENT)
Dept: FAMILY MEDICINE CLINIC | Facility: CLINIC | Age: 33
End: 2023-12-13

## 2023-12-13 NOTE — LETTER
Guanako Rivero Jr.  [address] [facility] [address]      12/13/2023       Dear Guanako     Records from Insurance indicate that you are a patient of Sue Gerber MD with [facility], [facility]. Your last office visit was on 8/9/2019.  Please call the office to ensure you remain established and to schedule your Annual Physical at [phone].    If you are seeing a primary care provider other than the one listed above, you can simply call the number on the back of your insurance card to change your primary care physician with your insurance company.    We thank you for choosing [facility] for your healthcare needs.    Sincerely,    Isabelle Parks MA  Practice Based   [facility]

## 2024-01-18 NOTE — TELEPHONE ENCOUNTER
01/18/24 10:39 AM        The office's request has been received, reviewed, and the patient chart updated. The PCP has successfully been removed with a patient attribution note. This message will now be completed.        Thank you  Khadar Oconnell

## (undated) DEVICE — IRRIG ENDO FLO TUBING

## (undated) DEVICE — TROCAR APPPLE 5MM EXTENDED LENGTH

## (undated) DEVICE — ALLENTOWN LAP CHOLE APP PACK: Brand: CARDINAL HEALTH

## (undated) DEVICE — INTENDED FOR TISSUE SEPARATION, AND OTHER PROCEDURES THAT REQUIRE A SHARP SURGICAL BLADE TO PUNCTURE OR CUT.: Brand: BARD-PARKER SAFETY BLADES SIZE 11, STERILE

## (undated) DEVICE — ADHESIVE SKN CLSR HISTOACRYL FLEX 0.5ML LF

## (undated) DEVICE — ENDOPATH ETS-FLEX45 ARTICULATING ENDOSCOPIC LINEAR CUTTER, NO RELOAD: Brand: ENDOPATH

## (undated) DEVICE — UNDYED BRAIDED (POLYGLACTIN 910), SYNTHETIC ABSORBABLE SUTURE: Brand: COATED VICRYL

## (undated) DEVICE — HARMONIC 1100 SHEARS, 36CM SHAFT LENGTH: Brand: HARMONIC

## (undated) DEVICE — VIAL DECANTER

## (undated) DEVICE — LIGHT HANDLE COVER SLEEVE DISP BLUE STELLAR

## (undated) DEVICE — NEEDLE 22 G X 1 1/2 SAFETY

## (undated) DEVICE — TISSUE RETRIEVAL SYSTEM: Brand: INZII RETRIEVAL SYSTEM

## (undated) DEVICE — GLOVE SRG BIOGEL ECLIPSE 7

## (undated) DEVICE — SUT MONOCRYL 4-0 PS-2 27 IN Y426H

## (undated) DEVICE — STERILE SURGICAL LUBRICANT,  TUBE: Brand: SURGILUBE

## (undated) DEVICE — CHLORAPREP HI-LITE 26ML ORANGE

## (undated) DEVICE — DRAPE EQUIPMENT RF WAND

## (undated) DEVICE — SCD SEQUENTIAL COMPRESSION COMFORT SLEEVE MEDIUM KNEE LENGTH: Brand: KENDALL SCD

## (undated) DEVICE — ETS45 RELOAD STANDARD 45MM: Brand: ENDOPATH

## (undated) DEVICE — TROCAR: Brand: KII FIOS FIRST ENTRY